# Patient Record
Sex: FEMALE | Race: WHITE | NOT HISPANIC OR LATINO | Employment: FULL TIME | URBAN - METROPOLITAN AREA
[De-identification: names, ages, dates, MRNs, and addresses within clinical notes are randomized per-mention and may not be internally consistent; named-entity substitution may affect disease eponyms.]

---

## 2017-09-07 DIAGNOSIS — I10 ESSENTIAL HYPERTENSION WITH GOAL BLOOD PRESSURE LESS THAN 140/90: ICD-10-CM

## 2017-09-07 RX ORDER — LABETALOL 200 MG/1
TABLET, FILM COATED ORAL
Qty: 180 TABLET | Refills: 0 | Status: SHIPPED | OUTPATIENT
Start: 2017-09-07 | End: 2018-01-12

## 2017-09-07 NOTE — TELEPHONE ENCOUNTER
Pending Prescriptions:                       Disp   Refills    labetalol (NORMODYNE) 200 MG tablet [Phar*180 ta*3            Sig: TAKE 1 TABLET(200 MG) BY MOUTH TWICE DAILY          Last Written Prescription Date: 09/28/2016  Last Fill Quantity: 180, # refills: 3    Last Office Visit with [facility], [facility] or [facility] prescribing provider:  09/28/2016   Future Office Visit:        BP Readings from Last 3 Encounters:   09/28/16 128/86   08/02/16 (!) 146/94   05/13/16 (!) 145/92

## 2017-10-26 DIAGNOSIS — I10 ESSENTIAL HYPERTENSION WITH GOAL BLOOD PRESSURE LESS THAN 140/90: ICD-10-CM

## 2017-10-27 RX ORDER — LABETALOL 200 MG/1
TABLET, FILM COATED ORAL
Start: 2017-10-27

## 2017-10-27 NOTE — TELEPHONE ENCOUNTER
Denied   Refill request too early; Rx sent 9/7/2017 for 3 months  Ro MARTELL, RN    Pending Prescriptions:                       Disp   Refills    labetalol (NORMODYNE) 200 MG tablet [Pharm*60 tab*0        Sig: TAKE 1 TABLET(200 MG) BY MOUTH TWICE DAILY        Last Written Prescription Date:   Last Fill Quantity: , # refills:   Last Office Visit with [facility], [facility] or [facility] prescribing provider:        Potassium   Date Value Ref Range Status   09/28/2016 3.9 3.4 - 5.3 mmol/L Final     Creatinine   Date Value Ref Range Status   09/28/2016 0.70 0.52 - 1.04 mg/dL Final     BP Readings from Last 3 Encounters:   09/28/16 128/86   08/02/16 (!) 146/94   05/13/16 (!) 145/92

## 2017-11-13 DIAGNOSIS — I10 ESSENTIAL HYPERTENSION WITH GOAL BLOOD PRESSURE LESS THAN 140/90: ICD-10-CM

## 2017-11-14 RX ORDER — LABETALOL 200 MG/1
TABLET, FILM COATED ORAL
Start: 2017-11-14

## 2017-11-14 NOTE — TELEPHONE ENCOUNTER
Denied  Refill request too early; Rx sent 9/7/2017 for 3 months  Ro MARTELL RN    Requested Prescriptions   Pending Prescriptions Disp Refills     labetalol (NORMODYNE) 200 MG tablet [Pharmacy Med Name: LABETALOL 200MG TABLETS] 60 tablet 0     Sig: TAKE 1 TABLET(200 MG) BY MOUTH TWICE DAILY    Beta-Blockers Protocol Failed    11/13/2017  6:22 PM       Failed - Blood pressure under 140/90    BP Readings from Last 3 Encounters:   09/28/16 128/86   08/02/16 (!) 146/94   05/13/16 (!) 145/92                Failed - Recent or future visit with authorizing provider's specialty    Patient had office visit in the last year or has a visit in the next 30 days with authorizing provider.  See chart review.              Passed - Patient is age 6 or older

## 2017-12-13 DIAGNOSIS — M79.672 LEFT FOOT PAIN: Primary | ICD-10-CM

## 2017-12-14 ENCOUNTER — OFFICE VISIT (OUTPATIENT)
Dept: ORTHOPEDICS | Facility: CLINIC | Age: 52
End: 2017-12-14

## 2017-12-14 ENCOUNTER — RADIANT APPOINTMENT (OUTPATIENT)
Dept: GENERAL RADIOLOGY | Facility: CLINIC | Age: 52
End: 2017-12-14

## 2017-12-14 VITALS — SYSTOLIC BLOOD PRESSURE: 165 MMHG | RESPIRATION RATE: 18 BRPM | HEART RATE: 106 BPM | DIASTOLIC BLOOD PRESSURE: 108 MMHG

## 2017-12-14 DIAGNOSIS — E66.01 MORBID OBESITY (H): ICD-10-CM

## 2017-12-14 DIAGNOSIS — M79.672 ARCH PAIN OF LEFT FOOT: Primary | ICD-10-CM

## 2017-12-14 DIAGNOSIS — M79.672 LEFT FOOT PAIN: ICD-10-CM

## 2017-12-14 NOTE — MR AVS SNAPSHOT
After Visit Summary   12/14/2017    Neelam Tovar    MRN: [id number]           Patient Information     Date Of Birth          1965        Visit Information        Provider Department      12/14/2017 5:45 PM Ericka James MD [facility]        Today's Diagnoses     Arch pain of left foot    -  1    Morbid obesity (H)           Follow-ups after your visit        Additional Services     NUTRITION REFERRAL       Your provider has referred you to: PREFERRED PROVIDERS: Sabine Metcalf    Please be aware that coverage of these services is subject to the terms and limitations of your health insurance plan.  Call member services at your health plan with any benefit or coverage questions.      Please bring the following with you to your appointment:    (1) This referral request  (2) Any documents given to you regarding this referral  (3) Any specific questions you have about diet and/or food choices            PHYSICAL THERAPY REFERRAL (External-Prints)       Physical Therapy Referral                  Follow-up notes from your care team     Return in about 6 weeks (around 1/25/2018), or if symptoms worsen or fail to improve.      Your next 10 appointments already scheduled     Dameon 10, 2018  1:30 PM CST   NUTRITION VISIT with Sabine Metcalf RD   [facility] ([facility])    [address] [place] [address] [phone]              Who to contact     Please call your clinic at [phone] to:    Ask questions about your health    Make or cancel appointments    Discuss your medicines    Learn about your test results    Speak to your doctor   If you have compliments or concerns about an experience at your clinic, or if you wish to file a complaint, please contact [facility] Patient Relations at [phone] or email us at [email or web address]         Additional  Information About Your Visit        [employer] Information     [employer] gives you secure access to your electronic health record. If you see a primary care provider, you can also send messages to your care team and make appointments. If you have questions, please call your primary care clinic.  If you do not have a primary care provider, please call [phone] and they will assist you.      [employer] is an electronic gateway that provides easy, online access to your medical records. With [employer], you can request a clinic appointment, read your test results, renew a prescription or communicate with your care team.     To access your existing account, please contact your [facility] or call [phone] for assistance.        Care EveryWhere ID     This is your Care EveryWhere ID. This could be used by other organizations to access your [place] medical records  [id number]        Your Vitals Were     Pulse Respirations                106 18           Blood Pressure from Last 3 Encounters:   12/14/17 (!) 165/108   09/28/16 128/86   08/02/16 (!) 146/94    Weight from Last 3 Encounters:   09/28/16 (!) 354 lb (160.6 kg)   08/02/16 (!) 345 lb (156.5 kg)   05/13/16 (!) 344 lb (156 kg)              We Performed the Following     NUTRITION REFERRAL     PHYSICAL THERAPY REFERRAL (External-Prints)        Primary Care Provider Office Phone # Fax #    Madelin Davis [phone] [address] [address]        Equal Access to Services     HOLLY KELLER : Hadii aad ku hadasho Soomaali, waaxda luqadaha, qaybta kaalmada adeegyada, waxriaz radha jacobs . So [facility] [phone].    ATENCIÓN: Si habla español, tiene a souza disposición servicios gratuitos de asistencia lingüística. Llame al [phone].    We comply with applicable federal civil rights laws and [place] laws. We do not discriminate on the basis of race, color, national origin, age,  disability, sex, sexual orientation, or gender identity.            Thank you!     Thank you for choosing [facility]  for your care. Our goal is always to provide you with excellent care. Hearing back from our patients is one way we can continue to improve our services. Please take a few minutes to complete the written survey that you may receive in the mail after your visit with us. Thank you!             Your Updated Medication List - Protect others around you: Learn how to safely use, store and throw away your medicines at www.disposemymeds.org.          This list is accurate as of: 12/14/17 11:59 PM.  Always use your most recent med list.                   Brand Name Dispense Instructions for use Diagnosis    * albuterol 108 (90 BASE) MCG/ACT Inhaler    PROAIR HFA/PROVENTIL HFA/VENTOLIN HFA    1 Inhaler    Inhale 2 puffs into the lungs every 6 hours as needed for shortness of breath / dyspnea or wheezing    Bronchitis       * albuterol 108 (90 BASE) MCG/ACT Inhaler    PROAIR HFA/PROVENTIL HFA/VENTOLIN HFA    1 Inhaler    Inhale 2 puffs into the lungs every 6 hours as needed for shortness of breath / dyspnea or wheezing    Bronchitis       cefuroxime 250 MG tablet    CEFTIN     Take 250 mg by mouth 2 times daily        * clobetasol 0.05 % cream    TEMOVATE    60 g    Apply sparingly twice a day    Contact dermatitis       * clobetasol 0.05 % ointment    TEMOVATE    30 g    Apply sparingly to affected area twice daily for 14 days.  Do not apply to face.    Circumscribed scleroderma       labetalol 200 MG tablet    NORMODYNE    180 tablet    TAKE 1 TABLET(200 MG) BY MOUTH TWICE DAILY    Essential hypertension with goal blood pressure less than 140/90       * Notice:  This list has 4 medication(s) that are the same as other medications prescribed for you. Read the directions carefully, and ask your doctor or other care provider to review them with you.

## 2017-12-14 NOTE — LETTER
12/14/2017      RE: Neelam Tovar  [address]  [facility] [address]        Subjective:   Neelam Tovar is a 52 year old female who is here complaining of left foot pain. She doesn't have ROBYN. Pain started after a walk.   Too much too soon.  Spring time, hurt in the arch of the left foot.  6 weeks ago more walking, not wearing tennis shoes, not good shoes when have a high arch.  Worse and wants to know if there's more damage.    Plantar fascitis in the past.  Heel hurts and time to get moving in the morning.  Arch pain in the past both feet, now just the left.  No lifts.  Wearing different shoes, now tennis shoes daily.    Emotional eater, ok with support, nutrition knowledge.  In the car for 30 min and feet are stiff.  Wanting with special needs child, unable to continue walking program.  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  Pt reports know nutrition, doesn't always follow.    Background:   Date of injury: NA   Duration of symptoms: 6 weeks  Mechanism of Injury: Insidious Onset; Unknown    Aggravating factors: Walking, driving  Relieving Factors: rest, ice, NSAIDs and massage  Prior Evaluation: None    PAST MEDICAL, SOCIAL, SURGICAL AND FAMILY HISTORY: She  has a past medical history of Allergic rhinitis due to other allergen; HTN; Lichen sclerosis of vulva (8/07); and Obstructive sleep apnea (adult) (pediatric).  She  has a past surgical history that includes TOOTH EXTRACTION W/FORCEP and excis chalazion,single.  Her family history includes C.A.D. in her father and mother; CANCER in her maternal grandmother and mother; DIABETES in her mother; Hypertension in her father, mother, and sister; Respiratory in her father.  She reports that she has never smoked. She has never used smokeless tobacco. She reports that she drinks alcohol. She reports that she does not use illicit drugs.      ALLERGIES: She is allergic to no known allergies.    CURRENT MEDICATIONS: She has a current medication list  which includes the following prescription(s): labetalol, cefuroxime, clobetasol, albuterol, albuterol, and clobetasol.     REVIEW OF SYSTEMS: 10 point review of systems is negative except as noted above.     Exam:   There were no vitals taken for this visit.           CONSTITUTIONAL: healthy, alert, no distress and cooperative  HEAD: Normocephalic. No masses, lesions, tenderness or abnormalities  SKIN: no suspicious lesions or rashes  GAIT: antalgic and obese pattern  NEUROLOGIC: Non-focal, Normal muscle tone and strength, reflexes normal, sensation grossly normal.  PSYCHIATRIC: affect normal/bright and mentation appears normal.    MUSCULOSKELETAL: left foot pain, arch    ANKLE  Inspection/Palpation:     Swelling: no swelling      Non-tender: ATFL, CFL, PTFL, medial malleolus, deltoid ligament, anterior tib-fib ligament, achilles  tendon, no achilles tendon defect   Range of Motion: dorsiflexion: full, plantarflexion: full, inversion: full, eversion: full  Strength:dorsiflexion: 5/5, plantarflexion: 5/5, inversion: 5/5, eversion: 5/5   Special tests: negative anterior drawer, negative varus stress, negative valgus stress, negative forced external rotation, negative Crandall sign     FOOT  Inspection/Palpation:      Swelling: no swelling  Tender: medial arch, plantar fascia     Non-tender: promixal 5th metatarsal, 1st, 2nd, 3rd, 4th, 5th metatarsals, calcaneous, cuboid,  navicular, cuneiform lateral, cuneiform middle, cuneiform medial, metatarsal heads, peroneal tendon: at lateral malleolus, at cuboid, at proximal 5th metatarsal, posterior tibial tendon at medial malleolus, posterior tibial tendon at navicular  Range of Motion: flexion of toes: full, extension of toes: full         Assessment/Plan:   Pt is a 51 yo obese white female with PMHx of KAREN, HTN presenting with left foot pain  1. Left foot pain- medial arch- trial of superfeet, stretches demonstrated, ice, pool therapy  2. Obesity- nutrition referral Sabine  Dixon  RTC 6 weeks    X-RAY INTERPRETATION:   X-Ray of the Left Foot: 3-view, ap, lateral, oblique   ordered and interpreted in the office today was negative for fracture, subluxation or joint space abnormality.  IMPRESSION: No acute bone abnormality in the left foot.    Ericka James MD

## 2017-12-15 NOTE — PROGRESS NOTES
Subjective:   Neelam Tovar is a 52 year old female who is here complaining of left foot pain. She doesn't have ROBYN. Pain started after a walk.   Too much too soon.  Spring time, hurt in the arch of the left foot.  6 weeks ago more walking, not wearing tennis shoes, not good shoes when have a high arch.  Worse and wants to know if there's more damage.    Plantar fascitis in the past.  Heel hurts and time to get moving in the morning.  Arch pain in the past both feet, now just the left.  No lifts.  Wearing different shoes, now tennis shoes daily.    Emotional eater, ok with support, nutrition knowledge.  In the car for 30 min and feet are stiff.  Wanting with special needs child, unable to continue walking program.  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  Pt reports know nutrition, doesn't always follow.    Background:   Date of injury: NA   Duration of symptoms: 6 weeks  Mechanism of Injury: Insidious Onset; Unknown    Aggravating factors: Walking, driving  Relieving Factors: rest, ice, NSAIDs and massage  Prior Evaluation: None    PAST MEDICAL, SOCIAL, SURGICAL AND FAMILY HISTORY: She  has a past medical history of Allergic rhinitis due to other allergen; HTN; Lichen sclerosis of vulva (8/07); and Obstructive sleep apnea (adult) (pediatric).  She  has a past surgical history that includes TOOTH EXTRACTION W/FORCEP and excis chalazion,single.  Her family history includes C.A.D. in her father and mother; CANCER in her maternal grandmother and mother; DIABETES in her mother; Hypertension in her father, mother, and sister; Respiratory in her father.  She reports that she has never smoked. She has never used smokeless tobacco. She reports that she drinks alcohol. She reports that she does not use illicit drugs.      ALLERGIES: She is allergic to no known allergies.    CURRENT MEDICATIONS: She has a current medication list which includes the following prescription(s): labetalol, cefuroxime, clobetasol, albuterol,  albuterol, and clobetasol.     REVIEW OF SYSTEMS: 10 point review of systems is negative except as noted above.     Exam:   There were no vitals taken for this visit.           CONSTITUTIONAL: healthy, alert, no distress and cooperative  HEAD: Normocephalic. No masses, lesions, tenderness or abnormalities  SKIN: no suspicious lesions or rashes  GAIT: antalgic and obese pattern  NEUROLOGIC: Non-focal, Normal muscle tone and strength, reflexes normal, sensation grossly normal.  PSYCHIATRIC: affect normal/bright and mentation appears normal.    MUSCULOSKELETAL: left foot pain, arch    ANKLE  Inspection/Palpation:     Swelling: no swelling      Non-tender: ATFL, CFL, PTFL, medial malleolus, deltoid ligament, anterior tib-fib ligament, achilles  tendon, no achilles tendon defect   Range of Motion: dorsiflexion: full, plantarflexion: full, inversion: full, eversion: full  Strength:dorsiflexion: 5/5, plantarflexion: 5/5, inversion: 5/5, eversion: 5/5   Special tests: negative anterior drawer, negative varus stress, negative valgus stress, negative forced external rotation, negative Crandall sign     FOOT  Inspection/Palpation:      Swelling: no swelling  Tender: medial arch, plantar fascia     Non-tender: promixal 5th metatarsal, 1st, 2nd, 3rd, 4th, 5th metatarsals, calcaneous, cuboid,  navicular, cuneiform lateral, cuneiform middle, cuneiform medial, metatarsal heads, peroneal tendon: at lateral malleolus, at cuboid, at proximal 5th metatarsal, posterior tibial tendon at medial malleolus, posterior tibial tendon at navicular  Range of Motion: flexion of toes: full, extension of toes: full         Assessment/Plan:   Pt is a 53 yo obese white female with PMHx of KAREN, HTN presenting with left foot pain  1. Left foot pain- medial arch- trial of superfeet, stretches demonstrated, ice, pool therapy  2. Obesity- nutrition referral Sabine Metcalf  RTC 6 weeks    X-RAY INTERPRETATION:   X-Ray of the Left Foot: 3-view, ap, lateral,  oblique   ordered and interpreted in the office today was negative for fracture, subluxation or joint space abnormality.  IMPRESSION: No acute bone abnormality in the left foot.

## 2018-01-12 ENCOUNTER — OFFICE VISIT (OUTPATIENT)
Dept: FAMILY MEDICINE | Facility: CLINIC | Age: 53
End: 2018-01-12
Payer: COMMERCIAL

## 2018-01-12 VITALS
OXYGEN SATURATION: 99 % | SYSTOLIC BLOOD PRESSURE: 148 MMHG | TEMPERATURE: 98.7 F | HEART RATE: 76 BPM | DIASTOLIC BLOOD PRESSURE: 96 MMHG | WEIGHT: 293 LBS | BODY MASS INDEX: 48.82 KG/M2 | HEIGHT: 65 IN

## 2018-01-12 DIAGNOSIS — I10 ESSENTIAL HYPERTENSION WITH GOAL BLOOD PRESSURE LESS THAN 140/90: ICD-10-CM

## 2018-01-12 PROCEDURE — 36415 COLL VENOUS BLD VENIPUNCTURE: CPT | Performed by: FAMILY MEDICINE

## 2018-01-12 PROCEDURE — 80053 COMPREHEN METABOLIC PANEL: CPT | Performed by: FAMILY MEDICINE

## 2018-01-12 PROCEDURE — 99214 OFFICE O/P EST MOD 30 MIN: CPT | Performed by: FAMILY MEDICINE

## 2018-01-12 PROCEDURE — 84443 ASSAY THYROID STIM HORMONE: CPT | Performed by: FAMILY MEDICINE

## 2018-01-12 RX ORDER — LABETALOL 200 MG/1
TABLET, FILM COATED ORAL
Qty: 180 TABLET | Refills: 0 | Status: SHIPPED | OUTPATIENT
Start: 2018-01-12 | End: 2018-02-13

## 2018-01-12 RX ORDER — FLUTICASONE PROPIONATE 50 MCG
SPRAY, SUSPENSION (ML) NASAL DAILY
COMMUNITY
End: 2020-04-22

## 2018-01-12 NOTE — NURSING NOTE
"Chief Complaint   Patient presents with     Hypertension     BP (!) 148/96  Pulse 76  Temp 98.7  F (37.1  C) (Tympanic)  Ht 5' 5\" (1.651 m)  Wt (!) 348 lb (157.9 kg)  SpO2 99%  BMI 57.91 kg/m2 Estimated body mass index is 57.91 kg/(m^2) as calculated from the following:    Height as of this encounter: 5' 5\" (1.651 m).    Weight as of this encounter: 348 lb (157.9 kg).  Medication Reconciliation: complete      Health Maintenance due pending provider review:  Colonoscopy/FIT    Would like FIT    Isaura Rae CMA  "

## 2018-01-12 NOTE — MR AVS SNAPSHOT
After Visit Summary   1/12/2018    Neelam Tovar    MRN: [id number]           Patient Information     Date Of Birth          1965        Visit Information        Provider Department      1/12/2018 2:00 PM Madelin Davis MD [facility]        Today's Diagnoses     Essential hypertension with goal blood pressure less than 140/90           Follow-ups after your visit        Your next 10 appointments already scheduled     Feb 13, 2018  2:30 PM CST   MyChart Long with Madelin Davis MD   [facility] ([facility])    [address] [facility] [address] [phone]              Who to contact     If you have questions or need follow up information about today's clinic visit or your schedule please contact [facility] directly at [phone].  Normal or non-critical lab and imaging results will be communicated to you by MyChart, letter or phone within 4 business days after the clinic has received the results. If you do not hear from us within 7 days, please contact the clinic through [employer] or phone. If you have a critical or abnormal lab result, we will notify you by phone as soon as possible.  Submit refill requests through [employer] or call your pharmacy and they will forward the refill request to us. Please allow 3 business days for your refill to be completed.          Additional Information About Your Visit        MyChart Information     [employer] gives you secure access to your electronic health record. If you see a primary care provider, you can also send messages to your care team and make appointments. If you have questions, please call your primary care clinic.  If you do not have a primary care provider, please call [phone] and they will assist you.        Care EveryWhere ID     This is your Care EveryWhere ID. This could be used by other organizations to access your [place] medical records  [id number]        Your Vitals  "Were     Pulse Temperature Height Pulse Oximetry BMI (Body Mass Index)       76 98.7  F (37.1  C) (Tympanic) 5' 5\" (1.651 m) 99% 57.91 kg/m2        Blood Pressure from Last 3 Encounters:   01/12/18 (!) 148/96   12/14/17 (!) 165/108   09/28/16 128/86    Weight from Last 3 Encounters:   01/12/18 (!) 348 lb (157.9 kg)   09/28/16 (!) 354 lb (160.6 kg)   08/02/16 (!) 345 lb (156.5 kg)              We Performed the Following     Comprehensive metabolic panel (BMP + Alb, Alk Phos, ALT, AST, Total. Bili, TP)     TSH with free T4 reflex          Today's Medication Changes          These changes are accurate as of: 1/12/18 11:59 PM.  If you have any questions, ask your nurse or doctor.               These medicines have changed or have updated prescriptions.        Dose/Directions    clobetasol 0.05 % ointment   Commonly known as:  TEMOVATE   This may have changed:  Another medication with the same name was removed. Continue taking this medication, and follow the directions you see here.   Used for:  Circumscribed scleroderma   Changed by:  Madelin Davis MD        Apply sparingly to affected area twice daily for 14 days.  Do not apply to face.   Quantity:  30 g   Refills:  3       labetalol 200 MG tablet   Commonly known as:  NORMODYNE   This may have changed:  See the new instructions.   Used for:  Essential hypertension with goal blood pressure less than 140/90   Changed by:  Madelin Davis MD        TAKE 1 TABLET(200 MG) BY MOUTH TWICE DAILY   Quantity:  180 tablet   Refills:  0         Stop taking these medicines if you haven't already. Please contact your care team if you have questions.     cefuroxime 250 MG tablet   Commonly known as:  CEFTIN   Stopped by:  Madelin Davis MD                Where to get your medicines      These medications were sent to "[facility] [address] [place] [address] AT [address] [place] [address] & [address] [address]     Phone:  [phone]    "  labetalol 200 MG tablet                Primary Care Provider Office Phone # Fax #    Madelin Davis [phone] [address] [address]        Equal Access to Services     HOLLY KELLER : Hadii aad ku hadkarynao Soomaali, waaxda luqadaha, qaybta kaalmada adeegyada, christiano singhn john galicia lapoojamagy horner. So [facility] [phone].    ATENCIÓN: Si habla español, tiene a souza disposición servicios gratuitos de asistencia lingüística. Llame al [phone].    We comply with applicable federal civil rights laws and [place] laws. We do not discriminate on the basis of race, color, national origin, age, disability, sex, sexual orientation, or gender identity.            Thank you!     Thank you for choosing [facility]  for your care. Our goal is always to provide you with excellent care. Hearing back from our patients is one way we can continue to improve our services. Please take a few minutes to complete the written survey that you may receive in the mail after your visit with us. Thank you!             Your Updated Medication List - Protect others around you: Learn how to safely use, store and throw away your medicines at www.disposemymeds.org.          This list is accurate as of: 1/12/18 11:59 PM.  Always use your most recent med list.                   Brand Name Dispense Instructions for use Diagnosis    * albuterol 108 (90 BASE) MCG/ACT Inhaler    PROAIR HFA/PROVENTIL HFA/VENTOLIN HFA    1 Inhaler    Inhale 2 puffs into the lungs every 6 hours as needed for shortness of breath / dyspnea or wheezing    Bronchitis       * albuterol 108 (90 BASE) MCG/ACT Inhaler    PROAIR HFA/PROVENTIL HFA/VENTOLIN HFA    1 Inhaler    Inhale 2 puffs into the lungs every 6 hours as needed for shortness of breath / dyspnea or wheezing    Bronchitis       clobetasol 0.05 % ointment    TEMOVATE    30 g    Apply sparingly to affected area twice daily for 14 days.  Do not apply to face.     Circumscribed scleroderma       fluticasone 50 MCG/ACT spray    FLONASE     Spray into both nostrils daily        labetalol 200 MG tablet    NORMODYNE    180 tablet    TAKE 1 TABLET(200 MG) BY MOUTH TWICE DAILY    Essential hypertension with goal blood pressure less than 140/90       * Notice:  This list has 2 medication(s) that are the same as other medications prescribed for you. Read the directions carefully, and ask your doctor or other care provider to review them with you.

## 2018-01-12 NOTE — PROGRESS NOTES
SUBJECTIVE:   Neelam Tovar is a 52 year old female who presents to clinic today for the following health issues:      Hypertension Follow-up      Outpatient blood pressures are not being checked.    Low Salt Diet: low salt        Amount of exercise or physical activity: nothing regular, starting PT this week for foot    Problems taking medications regularly: Yes--missing about 3/7 days week morning dose    Medication side effects: none    Diet: regular (no restrictions)            Problem list and histories reviewed & adjusted, as indicated.  Additional history: as documented    Patient Active Problem List   Diagnosis     Other acne     Obstructive sleep apnea     Allergic rhinitis due to other allergen     Lichen sclerosis of vulva     CARDIOVASCULAR SCREENING; LDL GOAL LESS THAN 160     Hypertension goal BP (blood pressure) < 140/90     Allergic rhinitis due to pollen     Past Surgical History:   Procedure Laterality Date     EXCIS CHALAZION,SINGLE       HC TOOTH EXTRACTION W/FORCEP         Social History   Substance Use Topics     Smoking status: Never Smoker     Smokeless tobacco: Never Used     Alcohol use 0.0 oz/week     0 Standard drinks or equivalent per week      Comment: 2 drinks per week     Family History   Problem Relation Age of Onset     C.A.D. Mother      C.A.D. Father      DIABETES Mother      Hypertension Mother      Hypertension Father      CANCER Maternal Grandmother      Respiratory Father      sleep apnea     CANCER Mother      pancreatic      Hypertension Sister          Current Outpatient Prescriptions   Medication Sig Dispense Refill     fluticasone (FLONASE) 50 MCG/ACT spray Spray into both nostrils daily       labetalol (NORMODYNE) 200 MG tablet TAKE 1 TABLET(200 MG) BY MOUTH TWICE DAILY 180 tablet 0     clobetasol (TEMOVATE) 0.05 % ointment Apply sparingly to affected area twice daily for 14 days.  Do not apply to face. 30 g 3     albuterol (PROAIR HFA, PROVENTIL HFA, VENTOLIN  "HFA) 108 (90 BASE) MCG/ACT inhaler Inhale 2 puffs into the lungs every 6 hours as needed for shortness of breath / dyspnea or wheezing (Patient not taking: Reported on 12/14/2017) 1 Inhaler 3     albuterol (PROAIR HFA, PROVENTIL HFA, VENTOLIN HFA) 108 (90 BASE) MCG/ACT inhaler Inhale 2 puffs into the lungs every 6 hours as needed for shortness of breath / dyspnea or wheezing (Patient not taking: Reported on 12/14/2017) 1 Inhaler 3     Allergies   Allergen Reactions     Dust Mites      Recent Labs   Lab Test  01/12/18   1446  09/28/16   1615  06/24/14   1518   01/28/11   1116   LDL   --   118*   --    --   107   HDL   --   57   --    --   49*   TRIG   --   133   --    --   94   ALT  22  19   --    --    --    CR  0.77  0.70  0.91   < >  0.75   GFRESTIMATED  79  89  66   < >  84   GFRESTBLACK  >90  >90  African American GFR Calc    80   < >  >90   POTASSIUM  3.9  3.9  4.2   < >  4.4   TSH  1.97   --   2.26   --   3.30    < > = values in this interval not displayed.      BP Readings from Last 3 Encounters:   01/12/18 (!) 148/96   12/14/17 (!) 165/108   09/28/16 128/86    Wt Readings from Last 3 Encounters:   01/12/18 (!) 348 lb (157.9 kg)   09/28/16 (!) 354 lb (160.6 kg)   08/02/16 (!) 345 lb (156.5 kg)                  Labs reviewed in EPIC          Reviewed and updated as needed this visit by clinical staffTobacco  Allergies  Meds  Med Hx  Surg Hx  Fam Hx  Soc Hx      Reviewed and updated as needed this visit by Provider         ROS:  Constitutional, HEENT, cardiovascular, pulmonary, GI, , musculoskeletal, neuro, skin, endocrine and psych systems are negative, except as otherwise noted.      OBJECTIVE:   BP (!) 148/96  Pulse 76  Temp 98.7  F (37.1  C) (Tympanic)  Ht 5' 5\" (1.651 m)  Wt (!) 348 lb (157.9 kg)  SpO2 99%  BMI 57.91 kg/m2  Body mass index is 57.91 kg/(m^2).  GENERAL: healthy, alert and no distress  EYES: Eyes grossly normal to inspection, PERRL and conjunctivae and sclerae normal  HENT: ear " canals and TM's normal, nose and mouth without ulcers or lesions  NECK: no adenopathy, no asymmetry, masses, or scars and thyroid normal to palpation  RESP: lungs clear to auscultation - no rales, rhonchi or wheezes  CV: regular rate and rhythm, normal S1 S2, no S3 or S4, no murmur, click or rub, no peripheral edema and peripheral pulses strong  ABDOMEN: soft, nontender, no hepatosplenomegaly, no masses and bowel sounds normal  MS: no gross musculoskeletal defects noted, no edema  NEURO: Normal strength and tone, mentation intact and speech normal    Diagnostic Test Results:  Results for orders placed or performed in visit on 01/12/18   Comprehensive metabolic panel (BMP + Alb, Alk Phos, ALT, AST, Total. Bili, TP)   Result Value Ref Range    Sodium 141 133 - 144 mmol/L    Potassium 3.9 3.4 - 5.3 mmol/L    Chloride 108 94 - 109 mmol/L    Carbon Dioxide 28 20 - 32 mmol/L    Anion Gap 5 3 - 14 mmol/L    Glucose 99 70 - 99 mg/dL    Urea Nitrogen 15 7 - 30 mg/dL    Creatinine 0.77 0.52 - 1.04 mg/dL    GFR Estimate 79 >60 mL/min/1.7m2    GFR Estimate If Black >90 >60 mL/min/1.7m2    Calcium 8.9 8.5 - 10.1 mg/dL    Bilirubin Total 0.5 0.2 - 1.3 mg/dL    Albumin 4.0 3.4 - 5.0 g/dL    Protein Total 7.4 6.8 - 8.8 g/dL    Alkaline Phosphatase 81 40 - 150 U/L    ALT 22 0 - 50 U/L    AST 16 0 - 45 U/L   TSH with free T4 reflex   Result Value Ref Range    TSH 1.97 0.40 - 4.00 mU/L       ASSESSMENT/PLAN:             1. Essential hypertension with goal blood pressure less than 140/90  She is due for the FIT and will do , take the kit today , no hx of any BM issues , no  FH od colon cancer   Discussed taking the morning dose of the labetalol as she states that she only takes the evening one , forgets in the am , but her BP is elevated today , she states that she was rushing coming in here , we discussed after regularly checking at home and taking both doses of the labetalol , would need to come for a f/u check here in 2 weeks,  .pln    - Fecal colorectal cancer screen FIT; Future  - labetalol (NORMODYNE) 200 MG tablet; TAKE 1 TABLET(200 MG) BY MOUTH TWICE DAILY  Dispense: 180 tablet; Refill: 0  - Comprehensive metabolic panel (BMP + Alb, Alk Phos, ALT, AST, Total. Bili, TP)  - TSH with free T4 reflex    RTC if no improving or worsening.  Pt is aware  and comfortable with the current plan.      Madelin Davis MD  [facility]

## 2018-01-13 LAB
ALBUMIN SERPL-MCNC: 4 G/DL (ref 3.4–5)
ALP SERPL-CCNC: 81 U/L (ref 40–150)
ALT SERPL W P-5'-P-CCNC: 22 U/L (ref 0–50)
ANION GAP SERPL CALCULATED.3IONS-SCNC: 5 MMOL/L (ref 3–14)
AST SERPL W P-5'-P-CCNC: 16 U/L (ref 0–45)
BILIRUB SERPL-MCNC: 0.5 MG/DL (ref 0.2–1.3)
BUN SERPL-MCNC: 15 MG/DL (ref 7–30)
CALCIUM SERPL-MCNC: 8.9 MG/DL (ref 8.5–10.1)
CHLORIDE SERPL-SCNC: 108 MMOL/L (ref 94–109)
CO2 SERPL-SCNC: 28 MMOL/L (ref 20–32)
CREAT SERPL-MCNC: 0.77 MG/DL (ref 0.52–1.04)
GFR SERPL CREATININE-BSD FRML MDRD: 79 ML/MIN/1.7M2
GLUCOSE SERPL-MCNC: 99 MG/DL (ref 70–99)
POTASSIUM SERPL-SCNC: 3.9 MMOL/L (ref 3.4–5.3)
PROT SERPL-MCNC: 7.4 G/DL (ref 6.8–8.8)
SODIUM SERPL-SCNC: 141 MMOL/L (ref 133–144)
TSH SERPL DL<=0.005 MIU/L-ACNC: 1.97 MU/L (ref 0.4–4)

## 2018-01-21 PROCEDURE — 82274 ASSAY TEST FOR BLOOD FECAL: CPT | Performed by: FAMILY MEDICINE

## 2018-01-24 DIAGNOSIS — I10 ESSENTIAL HYPERTENSION WITH GOAL BLOOD PRESSURE LESS THAN 140/90: ICD-10-CM

## 2018-01-24 LAB — HEMOCCULT STL QL IA: NEGATIVE

## 2018-02-13 ENCOUNTER — OFFICE VISIT (OUTPATIENT)
Dept: FAMILY MEDICINE | Facility: CLINIC | Age: 53
End: 2018-02-13
Payer: COMMERCIAL

## 2018-02-13 VITALS
OXYGEN SATURATION: 97 % | TEMPERATURE: 99 F | DIASTOLIC BLOOD PRESSURE: 78 MMHG | WEIGHT: 293 LBS | HEIGHT: 65 IN | HEART RATE: 77 BPM | BODY MASS INDEX: 48.82 KG/M2 | SYSTOLIC BLOOD PRESSURE: 132 MMHG

## 2018-02-13 DIAGNOSIS — J01.01 ACUTE RECURRENT MAXILLARY SINUSITIS: Primary | ICD-10-CM

## 2018-02-13 DIAGNOSIS — I10 ESSENTIAL HYPERTENSION WITH GOAL BLOOD PRESSURE LESS THAN 140/90: ICD-10-CM

## 2018-02-13 PROCEDURE — 99214 OFFICE O/P EST MOD 30 MIN: CPT | Performed by: FAMILY MEDICINE

## 2018-02-13 RX ORDER — LABETALOL 200 MG/1
TABLET, FILM COATED ORAL
Qty: 180 TABLET | Refills: 3 | Status: SHIPPED | OUTPATIENT
Start: 2018-02-13 | End: 2018-09-28

## 2018-02-13 RX ORDER — LABETALOL 200 MG/1
TABLET, FILM COATED ORAL
Qty: 180 TABLET | Refills: 0 | Status: CANCELLED | OUTPATIENT
Start: 2018-02-13

## 2018-02-13 RX ORDER — AMOXICILLIN AND CLAVULANATE POTASSIUM 500; 125 MG/1; MG/1
1 TABLET, FILM COATED ORAL 2 TIMES DAILY
Qty: 20 TABLET | Refills: 0 | Status: SHIPPED | OUTPATIENT
Start: 2018-02-13 | End: 2018-03-12

## 2018-02-13 NOTE — MR AVS SNAPSHOT
"              After Visit Summary   2/13/2018    Neelam Tovar    MRN: [id number]           Patient Information     Date Of Birth          1965        Visit Information        Provider Department      2/13/2018 2:30 PM Madelin Davis MD [facility]        Today's Diagnoses     Acute recurrent maxillary sinusitis    -  1    Essential hypertension with goal blood pressure less than 140/90           Follow-ups after your visit        Who to contact     If you have questions or need follow up information about today's clinic visit or your schedule please contact [facility] directly at [phone].  Normal or non-critical lab and imaging results will be communicated to you by MyChart, letter or phone within 4 business days after the clinic has received the results. If you do not hear from us within 7 days, please contact the clinic through [employer]ger[employer] or phone. If you have a critical or abnormal lab result, we will notify you by phone as soon as possible.  Submit refill requests through [employer] or call your pharmacy and they will forward the refill request to us. Please allow 3 business days for your refill to be completed.          Additional Information About Your Visit        MyChart Information     [employer] gives you secure access to your electronic health record. If you see a primary care provider, you can also send messages to your care team and make appointments. If you have questions, please call your primary care clinic.  If you do not have a primary care provider, please call [phone] and they will assist you.        Care EveryWhere ID     This is your Care EveryWhere ID. This could be used by other organizations to access your [place] medical records  [id number]        Your Vitals Were     Pulse Temperature Height Pulse Oximetry Breastfeeding? BMI (Body Mass Index)    77 99  F (37.2  C) (Tympanic) 5' 5\" (1.651 m) 97% No 57.91 kg/m2       Blood Pressure from Last 3 " Encounters:   02/13/18 132/78   01/12/18 (!) 148/96   12/14/17 (!) 165/108    Weight from Last 3 Encounters:   02/13/18 (!) 348 lb (157.9 kg)   01/12/18 (!) 348 lb (157.9 kg)   09/28/16 (!) 354 lb (160.6 kg)              Today, you had the following     No orders found for display         Today's Medication Changes          These changes are accurate as of 2/13/18 11:59 PM.  If you have any questions, ask your nurse or doctor.               Start taking these medicines.        Dose/Directions    amoxicillin-clavulanate 500-125 MG per tablet   Commonly known as:  AUGMENTIN   Used for:  Acute recurrent maxillary sinusitis   Started by:  Madelin Davis MD        Dose:  1 tablet   Take 1 tablet by mouth 2 times daily   Quantity:  20 tablet   Refills:  0            Where to get your medicines      These medications were sent to [facility] [address] [place] [id number]     Phone:  [phone]     amoxicillin-clavulanate 500-125 MG per tablet    labetalol 200 MG tablet                Primary Care Provider Office Phone # Fax #    Madelin Davis [phone] [address] [facility] [address] [place] [id number]        Equal Access to Services     HOLLY KELLER AH: Hadii amy reynolds hadasho Soomaali, waaxda luqadaha, qaybta kaalmada adeegyada, christiano horner. So [facility] [phone].    ATENCIÓN: Si habla español, tiene a souza disposición servicios gratuitos de asistencia lingüística. Lionel al [phone].    We comply with applicable federal civil rights laws and [place] laws. We do not discriminate on the basis of race, color, national origin, age, disability, sex, sexual orientation, or gender identity.            Thank you!     Thank you for choosing [facility]  for your care. Our goal is always to provide you with excellent care. Hearing back from our patients is one way we can  continue to improve our services. Please take a few minutes to complete the written survey that you may receive in the mail after your visit with us. Thank you!             Your Updated Medication List - Protect others around you: Learn how to safely use, store and throw away your medicines at www.disposemymeds.org.          This list is accurate as of 2/13/18 11:59 PM.  Always use your most recent med list.                   Brand Name Dispense Instructions for use Diagnosis    albuterol 108 (90 BASE) MCG/ACT Inhaler    PROAIR HFA/PROVENTIL HFA/VENTOLIN HFA    1 Inhaler    Inhale 2 puffs into the lungs every 6 hours as needed for shortness of breath / dyspnea or wheezing    Bronchitis       amoxicillin-clavulanate 500-125 MG per tablet    AUGMENTIN    20 tablet    Take 1 tablet by mouth 2 times daily    Acute recurrent maxillary sinusitis       clobetasol 0.05 % ointment    TEMOVATE    30 g    Apply sparingly to affected area twice daily for 14 days.  Do not apply to face.    Circumscribed scleroderma       fluticasone 50 MCG/ACT spray    FLONASE     Spray into both nostrils daily        labetalol 200 MG tablet    NORMODYNE    180 tablet    TAKE 1 TABLET(200 MG) BY MOUTH TWICE DAILY    Essential hypertension with goal blood pressure less than 140/90

## 2018-02-13 NOTE — PROGRESS NOTES
SUBJECTIVE:   Neelam Tovar is a 52 year old female who presents to clinic today for the following health issues:      Hypertension Follow-up she has not been checking her blood pressure at home, but started taking labetalol twice a day.  Does not feel as dizzy as before ,seems the medications working.  No side effects ,she has been on labetalol for a few years now.      Outpatient blood pressures are not being checked.    Low Salt Diet: no added salt-low salt      Amount of exercise or physical activity: 2-3 days/week for an average of 30-45 minutes    Problems taking medications regularly: Yes,  problems remembering to take second dose    Medication side effects: none    Diet: low salt and breakfast skipped    2) also complains of maxillary sinus pressure for the last 2 weeks, started with an upper respiratory infection, now headaches and facial sensitivity, no fever, some mucus producing cough.  She has a history of sinus infections and worried that this is getting worse.  No wheezing, no chills.        Problem list and histories reviewed & adjusted, as indicated.  Additional history: as documented    Patient Active Problem List   Diagnosis     Other acne     Obstructive sleep apnea     Allergic rhinitis due to other allergen     Lichen sclerosis of vulva     CARDIOVASCULAR SCREENING; LDL GOAL LESS THAN 160     Hypertension goal BP (blood pressure) < 140/90     Allergic rhinitis due to pollen     Past Surgical History:   Procedure Laterality Date     EXCIS CHALAZION,SINGLE       HC TOOTH EXTRACTION W/FORCEP         Social History   Substance Use Topics     Smoking status: Never Smoker     Smokeless tobacco: Never Used     Alcohol use 0.0 oz/week     0 Standard drinks or equivalent per week      Comment: 2 drinks per week     Family History   Problem Relation Age of Onset     C.A.D. Mother      C.A.D. Father      DIABETES Mother      Hypertension Mother      Hypertension Father      CANCER Maternal  Grandmother      Respiratory Father      sleep apnea     CANCER Mother      pancreatic      Hypertension Sister          Current Outpatient Prescriptions   Medication Sig Dispense Refill     amoxicillin-clavulanate (AUGMENTIN) 500-125 MG per tablet Take 1 tablet by mouth 2 times daily 20 tablet 0     labetalol (NORMODYNE) 200 MG tablet TAKE 1 TABLET(200 MG) BY MOUTH TWICE DAILY 180 tablet 3     fluticasone (FLONASE) 50 MCG/ACT spray Spray into both nostrils daily       clobetasol (TEMOVATE) 0.05 % ointment Apply sparingly to affected area twice daily for 14 days.  Do not apply to face. 30 g 3     albuterol (PROAIR HFA, PROVENTIL HFA, VENTOLIN HFA) 108 (90 BASE) MCG/ACT inhaler Inhale 2 puffs into the lungs every 6 hours as needed for shortness of breath / dyspnea or wheezing (Patient not taking: Reported on 2/13/2018) 1 Inhaler 3     Allergies   Allergen Reactions     Dust Mites      Recent Labs   Lab Test  01/12/18   1446  09/28/16   1615  06/24/14   1518   01/28/11   1116   LDL   --   118*   --    --   107   HDL   --   57   --    --   49*   TRIG   --   133   --    --   94   ALT  22  19   --    --    --    CR  0.77  0.70  0.91   < >  0.75   GFRESTIMATED  79  89  66   < >  84   GFRESTBLACK  >90  >90  African American GFR Calc    80   < >  >90   POTASSIUM  3.9  3.9  4.2   < >  4.4   TSH  1.97   --   2.26   --   3.30    < > = values in this interval not displayed.      BP Readings from Last 3 Encounters:   02/13/18 132/78   01/12/18 (!) 148/96   12/14/17 (!) 165/108    Wt Readings from Last 3 Encounters:   02/13/18 (!) 348 lb (157.9 kg)   01/12/18 (!) 348 lb (157.9 kg)   09/28/16 (!) 354 lb (160.6 kg)                  Labs reviewed in EPIC    Reviewed and updated as needed this visit by clinical staff       Reviewed and updated as needed this visit by Provider         ROS:  Constitutional, HEENT, cardiovascular, pulmonary, GI, , musculoskeletal, neuro, skin, endocrine and psych systems are negative, except as  "otherwise noted.    OBJECTIVE:     /78  Pulse 77  Temp 99  F (37.2  C) (Tympanic)  Ht 5' 5\" (1.651 m)  Wt (!) 348 lb (157.9 kg)  SpO2 97%  Breastfeeding? No  BMI 57.91 kg/m2  Body mass index is 57.91 kg/(m^2).  GENERAL: healthy, alert and no distress  EYES: Eyes grossly normal to inspection, PERRL and conjunctivae and sclerae normal  HENT: ear canals and TM's normal, nose and mouth without ulcers or lesions  NECK: no adenopathy, no asymmetry, masses, or scars and thyroid normal to palpation  RESP: lungs clear to auscultation - no rales, rhonchi or wheezes  CV: regular rate and rhythm, normal S1 S2, no S3 or S4, no murmur, click or rub, no peripheral edema and peripheral pulses strong  MS: no gross musculoskeletal defects noted, no edema  NEURO: Normal strength and tone, mentation intact and speech normal    Diagnostic Test Results:  No results found for this or any previous visit (from the past 24 hour(s)).    ASSESSMENT/PLAN:             1. Essential hypertension with goal blood pressure less than 140/90  We discussed continuing labetalol twice a day.  Patient has had days where she forgets to take the morning dose.  She does tolerate this medication well and has no side effects, would need to come back for a follow-up in 3 months.    - labetalol (NORMODYNE) 200 MG tablet; TAKE 1 TABLET(200 MG) BY MOUTH TWICE DAILY  Dispense: 180 tablet; Refill: 3    2. Acute recurrent maxillary sinusitis  We discussed using [place] pot, lots of fluids, decongestant, rest and if that does not help she will start the antibiotic Augmentin for 10 days.  - amoxicillin-clavulanate (AUGMENTIN) 500-125 MG per tablet; Take 1 tablet by mouth 2 times daily  Dispense: 20 tablet; Refill: 0    She will come back if no improvement or any worsening symptoms.RTC if no improving or worsening.  Pt is aware  and comfortable with the current plan.      Madelin Davis MD  [facility]    "

## 2018-02-13 NOTE — NURSING NOTE
"Chief Complaint   Patient presents with     Hypertension     FOLLOW UP FOR MED REFILL       Initial /78  Pulse 77  Temp 99  F (37.2  C) (Tympanic)  Ht 5' 5\" (1.651 m)  Wt (!) 348 lb (157.9 kg)  SpO2 97%  Breastfeeding? No  BMI 57.91 kg/m2 Estimated body mass index is 57.91 kg/(m^2) as calculated from the following:    Height as of this encounter: 5' 5\" (1.651 m).    Weight as of this encounter: 348 lb (157.9 kg).  BP completed using cuff size: X-large    Health Maintenance that is potentially due pending provider review:  Health Maintenance Due   Topic Date Due     INFLUENZA VACCINE (SYSTEM ASSIGNED)  09/01/2017         declined  "

## 2018-03-12 ENCOUNTER — OFFICE VISIT (OUTPATIENT)
Dept: FAMILY MEDICINE | Facility: CLINIC | Age: 53
End: 2018-03-12
Payer: COMMERCIAL

## 2018-03-12 VITALS
BODY MASS INDEX: 48.82 KG/M2 | RESPIRATION RATE: 16 BRPM | HEART RATE: 98 BPM | SYSTOLIC BLOOD PRESSURE: 142 MMHG | TEMPERATURE: 98.5 F | OXYGEN SATURATION: 97 % | DIASTOLIC BLOOD PRESSURE: 88 MMHG | HEIGHT: 65 IN | WEIGHT: 293 LBS

## 2018-03-12 DIAGNOSIS — N30.90 CYSTITIS: ICD-10-CM

## 2018-03-12 DIAGNOSIS — R30.0 DYSURIA: Primary | ICD-10-CM

## 2018-03-12 PROBLEM — E66.01 MORBID OBESITY (H): Status: ACTIVE | Noted: 2018-03-12

## 2018-03-12 LAB
ALBUMIN UR-MCNC: NEGATIVE MG/DL
APPEARANCE UR: CLEAR
BACTERIA #/AREA URNS HPF: ABNORMAL /HPF
BILIRUB UR QL STRIP: NEGATIVE
COLOR UR AUTO: YELLOW
GLUCOSE UR STRIP-MCNC: NEGATIVE MG/DL
HGB UR QL STRIP: ABNORMAL
KETONES UR STRIP-MCNC: NEGATIVE MG/DL
LEUKOCYTE ESTERASE UR QL STRIP: ABNORMAL
NITRATE UR QL: NEGATIVE
NON-SQ EPI CELLS #/AREA URNS LPF: ABNORMAL /LPF
PH UR STRIP: 5.5 PH (ref 5–7)
RBC #/AREA URNS AUTO: ABNORMAL /HPF
SOURCE: ABNORMAL
SP GR UR STRIP: 1.02 (ref 1–1.03)
UROBILINOGEN UR STRIP-ACNC: 0.2 EU/DL (ref 0.2–1)
WBC #/AREA URNS AUTO: ABNORMAL /HPF

## 2018-03-12 PROCEDURE — 99213 OFFICE O/P EST LOW 20 MIN: CPT | Performed by: PHYSICIAN ASSISTANT

## 2018-03-12 PROCEDURE — 81001 URINALYSIS AUTO W/SCOPE: CPT | Performed by: PHYSICIAN ASSISTANT

## 2018-03-12 RX ORDER — SULFAMETHOXAZOLE/TRIMETHOPRIM 800-160 MG
1 TABLET ORAL 2 TIMES DAILY
Qty: 14 TABLET | Refills: 0 | Status: SHIPPED | OUTPATIENT
Start: 2018-03-12 | End: 2018-09-28

## 2018-03-12 NOTE — MR AVS SNAPSHOT
"              After Visit Summary   3/12/2018    Neelam Tovar    MRN: [id number]           Patient Information     Date Of Birth          1965        Visit Information        Provider Department      3/12/2018 3:00 PM Silver Nelson PA-C [facility]        Today's Diagnoses     Dysuria    -  1    Cystitis           Follow-ups after your visit        Follow-up notes from your care team     Return if symptoms worsen or fail to improve.      Who to contact     If you have questions or need follow up information about today's clinic visit or your schedule please contact [facility] directly at [phone].  Normal or non-critical lab and imaging results will be communicated to you by KDPOFhart, letter or phone within 4 business days after the clinic has received the results. If you do not hear from us within 7 days, please contact the clinic through [employer] or phone. If you have a critical or abnormal lab result, we will notify you by phone as soon as possible.  Submit refill requests through [employer] or call your pharmacy and they will forward the refill request to us. Please allow 3 business days for your refill to be completed.          Additional Information About Your Visit        MyChart Information     [employer] gives you secure access to your electronic health record. If you see a primary care provider, you can also send messages to your care team and make appointments. If you have questions, please call your primary care clinic.  If you do not have a primary care provider, please call [phone] and they will assist you.        Care EveryWhere ID     This is your Care EveryWhere ID. This could be used by other organizations to access your [place] medical records  [id number]        Your Vitals Were     Pulse Temperature Respirations Height Last Period Pulse Oximetry    98 98.5  F (36.9  C) (Oral) 16 5' 5\" (1.651 m) 02/26/2018 97%    BMI (Body Mass Index)                "    56.75 kg/m2            Blood Pressure from Last 3 Encounters:   03/12/18 142/88   02/13/18 132/78   01/12/18 (!) 148/96    Weight from Last 3 Encounters:   03/12/18 (!) 341 lb (154.7 kg)   02/13/18 (!) 348 lb (157.9 kg)   01/12/18 (!) 348 lb (157.9 kg)              We Performed the Following     UA with Microscopic reflex to Culture          Today's Medication Changes          These changes are accurate as of 3/12/18 11:59 PM.  If you have any questions, ask your nurse or doctor.               Start taking these medicines.        Dose/Directions    sulfamethoxazole-trimethoprim 800-160 MG per tablet   Commonly known as:  BACTRIM DS/SEPTRA DS   Used for:  Dysuria   Started by:  Silver Nelson PA-C        Dose:  1 tablet   Take 1 tablet by mouth 2 times daily   Quantity:  14 tablet   Refills:  0            Where to get your medicines      Some of these will need a paper prescription and others can be bought over the counter.  Ask your nurse if you have questions.     Bring a paper prescription for each of these medications     sulfamethoxazole-trimethoprim 800-160 MG per tablet                Primary Care Provider Office Phone # Fax #    Madelin Davis [phone]       [facility] [address]        Equal Access to Services     HOLLY KELLER AH: Hadii amy reynolds hadasho Soomaali, waaxda luqadaha, qaybta kaalmada adeegyada, christiano horner. So [facility] [phone].    ATENCIÓN: Si habla español, tiene a souza disposición servicios gratuitos de asistencia lingüística. Lionel al [phone].    We comply with applicable federal civil rights laws and [place] laws. We do not discriminate on the basis of race, color, national origin, age, disability, sex, sexual orientation, or gender identity.            Thank you!     Thank you for choosing [facility]  for your care. Our goal is always to provide you with excellent care. Hearing back from our  patients is one way we can continue to improve our services. Please take a few minutes to complete the written survey that you may receive in the mail after your visit with us. Thank you!             Your Updated Medication List - Protect others around you: Learn how to safely use, store and throw away your medicines at www.disposemymeds.org.          This list is accurate as of 3/12/18 11:59 PM.  Always use your most recent med list.                   Brand Name Dispense Instructions for use Diagnosis    clobetasol 0.05 % ointment    TEMOVATE    30 g    Apply sparingly to affected area twice daily for 14 days.  Do not apply to face.    Circumscribed scleroderma       fluticasone 50 MCG/ACT spray    FLONASE     Spray into both nostrils daily        labetalol 200 MG tablet    NORMODYNE    180 tablet    TAKE 1 TABLET(200 MG) BY MOUTH TWICE DAILY    Essential hypertension with goal blood pressure less than 140/90       sulfamethoxazole-trimethoprim 800-160 MG per tablet    BACTRIM DS/SEPTRA DS    14 tablet    Take 1 tablet by mouth 2 times daily    Dysuria

## 2018-03-12 NOTE — NURSING NOTE
"Chief Complaint   Patient presents with     UTI     /88  Pulse 98  Temp 98.5  F (36.9  C) (Oral)  Resp 16  Ht 5' 5\" (1.651 m)  Wt (!) 341 lb (154.7 kg)  LMP 02/26/2018  SpO2 97%  BMI 56.75 kg/m2 Estimated body mass index is 56.75 kg/(m^2) as calculated from the following:    Height as of this encounter: 5' 5\" (1.651 m).    Weight as of this encounter: 341 lb (154.7 kg).  bp completed using cuff size: large       Health Maintenance addressed:  NONE    n/a    Mary Alberts MA     "

## 2018-05-04 ENCOUNTER — TELEPHONE (OUTPATIENT)
Dept: FAMILY MEDICINE | Facility: CLINIC | Age: 53
End: 2018-05-04

## 2018-05-04 NOTE — TELEPHONE ENCOUNTER
Sent patient Mychart message:  Dr. Susan Franz does need to see you for blood pressure follow up.  Your appointment with Jeffery Nelson was almost 2 months ago and blood pressure was not addressed.  Please call [phone] to schedule an appointment.  Reema Ibanez RN

## 2018-05-04 NOTE — TELEPHONE ENCOUNTER
Reason for Call:  Other call back    Detailed comments: Pt is supposed to follow up with a BP check.  Pt said that her BP seems to be fine and she was just in clinic in March to see Leslie (03/12/2018).  She is wondering if she can wait a bit before coming in for the next BP check?  PT is ok with Mychart response.      Phone Number Patient can be reached at: Home number on file [phone] (home)    Best Time: Any    Can we leave a detailed message on this number? YES    Call taken on 5/4/2018 at 4:23 PM by Chanel Garcia

## 2018-09-28 ENCOUNTER — OFFICE VISIT (OUTPATIENT)
Dept: FAMILY MEDICINE | Facility: CLINIC | Age: 53
End: 2018-09-28
Payer: COMMERCIAL

## 2018-09-28 VITALS
TEMPERATURE: 98.7 F | BODY MASS INDEX: 48.82 KG/M2 | WEIGHT: 293 LBS | HEIGHT: 65 IN | SYSTOLIC BLOOD PRESSURE: 138 MMHG | HEART RATE: 83 BPM | DIASTOLIC BLOOD PRESSURE: 82 MMHG | OXYGEN SATURATION: 99 %

## 2018-09-28 DIAGNOSIS — L94.0 CIRCUMSCRIBED SCLERODERMA: ICD-10-CM

## 2018-09-28 DIAGNOSIS — L30.9 ECZEMA, UNSPECIFIED TYPE: ICD-10-CM

## 2018-09-28 DIAGNOSIS — B35.1 ONYCHOMYCOSIS: Primary | ICD-10-CM

## 2018-09-28 DIAGNOSIS — I10 ESSENTIAL HYPERTENSION WITH GOAL BLOOD PRESSURE LESS THAN 140/90: ICD-10-CM

## 2018-09-28 LAB
ALBUMIN SERPL-MCNC: 3.8 G/DL (ref 3.4–5)
ALP SERPL-CCNC: 91 U/L (ref 40–150)
ALT SERPL W P-5'-P-CCNC: 18 U/L (ref 0–50)
ANION GAP SERPL CALCULATED.3IONS-SCNC: 11 MMOL/L (ref 3–14)
AST SERPL W P-5'-P-CCNC: 9 U/L (ref 0–45)
BILIRUB SERPL-MCNC: 0.4 MG/DL (ref 0.2–1.3)
BUN SERPL-MCNC: 15 MG/DL (ref 7–30)
CALCIUM SERPL-MCNC: 8.8 MG/DL (ref 8.5–10.1)
CHLORIDE SERPL-SCNC: 108 MMOL/L (ref 94–109)
CHOLEST SERPL-MCNC: 194 MG/DL
CO2 SERPL-SCNC: 23 MMOL/L (ref 20–32)
CREAT SERPL-MCNC: 0.75 MG/DL (ref 0.52–1.04)
GFR SERPL CREATININE-BSD FRML MDRD: 81 ML/MIN/1.7M2
GLUCOSE SERPL-MCNC: 100 MG/DL (ref 70–99)
HDLC SERPL-MCNC: 66 MG/DL
LDLC SERPL CALC-MCNC: 111 MG/DL
NONHDLC SERPL-MCNC: 128 MG/DL
POTASSIUM SERPL-SCNC: 4.1 MMOL/L (ref 3.4–5.3)
PROT SERPL-MCNC: 7.3 G/DL (ref 6.8–8.8)
SODIUM SERPL-SCNC: 142 MMOL/L (ref 133–144)
TRIGL SERPL-MCNC: 83 MG/DL

## 2018-09-28 PROCEDURE — 80061 LIPID PANEL: CPT | Performed by: FAMILY MEDICINE

## 2018-09-28 PROCEDURE — 99214 OFFICE O/P EST MOD 30 MIN: CPT | Performed by: FAMILY MEDICINE

## 2018-09-28 PROCEDURE — 80053 COMPREHEN METABOLIC PANEL: CPT | Performed by: FAMILY MEDICINE

## 2018-09-28 PROCEDURE — 36415 COLL VENOUS BLD VENIPUNCTURE: CPT | Performed by: FAMILY MEDICINE

## 2018-09-28 RX ORDER — CLOBETASOL PROPIONATE 0.5 MG/G
OINTMENT TOPICAL
Qty: 30 G | Refills: 3 | Status: SHIPPED | OUTPATIENT
Start: 2018-09-28 | End: 2020-04-22

## 2018-09-28 RX ORDER — LABETALOL 200 MG/1
TABLET, FILM COATED ORAL
Qty: 180 TABLET | Refills: 3 | Status: SHIPPED | OUTPATIENT
Start: 2018-09-28 | End: 2020-04-22

## 2018-09-28 NOTE — PATIENT INSTRUCTIONS
PLEASE CALL TO SCHEDULE YOUR MAMMOGRAM  [facility] [phone]  [facility] [place] [phone]  [facility]   [phone]

## 2018-09-28 NOTE — PROGRESS NOTES
SUBJECTIVE:   Neelam Tovar is a 53 year old female who presents to clinic today for the following health issues:    Patient request: Would like a referral to podiatry     Hypertension Follow-up      Outpatient blood pressures are not being checked.    Low Salt Diet: no added salt      Amount of exercise or physical activity: 2-3 days/week for an average of 15-30 minutes    Problems taking medications regularly: No    Medication side effects: none    Diet: regular (no restrictions), low sugar, and low salt        2) wants to discuss possible ADD< she has had troubles with focus and attention all her life but has never been on meds and never been evaluated for this , it is just hard for her with job and school ( she just finished her masters degree ) and taking care of a child with autism   3) feet issues - toe nail changes and some calluses , redness too , thinks one of the big toenails might be ingrown   4) eczema on finger tips , saw derm a yr ago and she gave her a different steroid cream but she did nto use , wondering if she can use the one for the lichen sclerosis or not       Problem list and histories reviewed & adjusted, as indicated.  Additional history: as documented    Patient Active Problem List   Diagnosis     Other acne     Obstructive sleep apnea     Allergic rhinitis due to other allergen     Lichen sclerosis of vulva     CARDIOVASCULAR SCREENING; LDL GOAL LESS THAN 160     Hypertension goal BP (blood pressure) < 140/90     Allergic rhinitis due to pollen     Morbid obesity (H)     Past Surgical History:   Procedure Laterality Date     EXCIS CHALAZION,SINGLE       HC TOOTH EXTRACTION W/FORCEP         Social History   Substance Use Topics     Smoking status: Never Smoker     Smokeless tobacco: Never Used     Alcohol use 0.0 oz/week     0 Standard drinks or equivalent per week      Comment: 2 drinks per week     Family History   Problem Relation Age of Onset     C.A.D. Mother      Diabetes  Mother      Hypertension Mother      Cancer Mother      pancreatic      C.A.D. Father      Hypertension Father      Respiratory Father      sleep apnea     Cancer Maternal Grandmother      Hypertension Sister          Current Outpatient Prescriptions   Medication Sig Dispense Refill     clobetasol (TEMOVATE) 0.05 % ointment Apply sparingly to affected area twice daily for 14 days.  Do not apply to face. 30 g 3     fluticasone (FLONASE) 50 MCG/ACT spray Spray into both nostrils daily       labetalol (NORMODYNE) 200 MG tablet TAKE 1 TABLET(200 MG) BY MOUTH TWICE DAILY 180 tablet 3     [DISCONTINUED] labetalol (NORMODYNE) 200 MG tablet TAKE 1 TABLET(200 MG) BY MOUTH TWICE DAILY 180 tablet 3     Allergies   Allergen Reactions     Dust Mites      Recent Labs   Lab Test  01/12/18   1446  09/28/16   1615  06/24/14   1518   01/28/11   1116   LDL   --   118*   --    --   107   HDL   --   57   --    --   49*   TRIG   --   133   --    --   94   ALT  22  19   --    --    --    CR  0.77  0.70  0.91   < >  0.75   GFRESTIMATED  79  89  66   < >  84   GFRESTBLACK  >90  >90  African American GFR Calc    80   < >  >90   POTASSIUM  3.9  3.9  4.2   < >  4.4   TSH  1.97   --   2.26   --   3.30    < > = values in this interval not displayed.      BP Readings from Last 3 Encounters:   09/28/18 138/82   03/12/18 142/88   02/13/18 132/78    Wt Readings from Last 3 Encounters:   09/28/18 (!) 345 lb 3.2 oz (156.6 kg)   03/12/18 (!) 341 lb (154.7 kg)   02/13/18 (!) 348 lb (157.9 kg)                  Labs reviewed in EPIC    Reviewed and updated as needed this visit by clinical staff       Reviewed and updated as needed this visit by Provider         ROS:  Constitutional, HEENT, cardiovascular, pulmonary, GI, , musculoskeletal, neuro, skin, endocrine and psych systems are negative, except as otherwise noted.    OBJECTIVE:     /82 (BP Location: Right arm, Patient Position: Chair, Cuff Size: Adult Large)  Pulse 83  Temp 98.7  F (37.1  " C) (Oral)  Ht 5' 5\" (1.651 m)  Wt (!) 345 lb 3.2 oz (156.6 kg)  SpO2 99%  Breastfeeding? No  BMI 57.44 kg/m2  Body mass index is 57.44 kg/(m^2).  GENERAL: healthy, alert and no distress  EYES: Eyes grossly normal to inspection, PERRL and conjunctivae and sclerae normal  HENT: ear canals and TM's normal, nose and mouth without ulcers or lesions  NECK: no adenopathy, no asymmetry, masses, or scars and thyroid normal to palpation  RESP: lungs clear to auscultation - no rales, rhonchi or wheezes  CV: regular rate and rhythm, normal S1 S2, no S3 or S4, no murmur, click or rub, no peripheral edema and peripheral pulses strong  ABDOMEN: soft, nontender, no hepatosplenomegaly, no masses and bowel sounds normal  MS: no gross musculoskeletal defects noted, no edema  SKIN: no suspicious lesions or rashes EXCEPT eczema on the fingertips , more on the left hand, she does have onychomycosis on the toenails both big ones and the second toenail on the right foot , heel calluses alexia.    Diagnostic Test Results:  No results found for this or any previous visit (from the past 24 hour(s)).    ASSESSMENT/PLAN:             1. Essential hypertension with goal blood pressure less than 140/90  She is due for labs and also will continue on the labetalol , she denies any side effects from it   - labetalol (NORMODYNE) 200 MG tablet; TAKE 1 TABLET(200 MG) BY MOUTH TWICE DAILY  Dispense: 180 tablet; Refill: 3  - Fecal colorectal cancer screen (FIT); Future  - Lipid Profile (Chol, Trig, HDL, LDL calc)  - Comprehensive metabolic panel (BMP + Alb, Alk Phos, ALT, AST, Total. Bili, TP)    2. Lichen sclerosis of vulva  She uses this when she has the lichen sclerosis exacerbation , currently only twice a week   - clobetasol (TEMOVATE) 0.05 % ointment; Apply sparingly to affected area twice daily for 14 days.  Do not apply to face.  Dispense: 30 g; Refill: 3    3. Onychomycosis  Discussed different treatments, local penlac which does not help much " but contains it form spreading , vs oral meds , monitoring of liver function and how long to take , pt will wait on this for now   - PODIATRY/FOOT & ANKLE SURGERY REFERRAL    4. Eczema - will try using the clobetazole sparingly on the fingertips and then not more than two weeks at a time , RTC if no improving or worsening.    RTC if no improving or worsening.  For the BP if it is well controlled at home once q 6 months , Pt is aware  and comfortable with the current plan.      Madelin Davis MD  [facility]

## 2018-09-28 NOTE — MR AVS SNAPSHOT
After Visit Summary   9/28/2018    Neelam Tovar    MRN: [id number]           Patient Information     Date Of Birth          1965        Visit Information        Provider Department      9/28/2018 8:30 AM Madelin Davis MD [facility]        Today's Diagnoses     Onychomycosis    -  1    Essential hypertension with goal blood pressure less than 140/90        Lichen sclerosis of vulva          Care Instructions    PLEASE CALL TO SCHEDULE YOUR MAMMOGRAM  [facility] [phone]  [facility] [phone]  [facility]   [phone]              Follow-ups after your visit        Additional Services     PODIATRY/FOOT & ANKLE SURGERY REFERRAL       Your provider has referred you to: [facility]: [facility] [phone]   [email or web address]    Please be aware that coverage of these services is subject to the terms and limitations of your health insurance plan.  Call member services at your health plan with any benefit or coverage questions.      Please bring the following to your appointment:  >>   Any x-rays, CTs or MRIs which have been performed.  Contact the facility where they were done to arrange for  prior to your scheduled appointment.    >>   List of current medications   >>   This referral request   >>   Any documents/labs given to you for this referral                  Future tests that were ordered for you today     Open Future Orders        Priority Expected Expires Ordered    Fecal colorectal cancer screen (FIT) Routine 10/19/2018 12/21/2018 9/28/2018            Who to contact     If you have questions or need follow up information about today's clinic visit or your schedule please contact [facility] directly at [phone].  Normal or non-critical lab and imaging results will be communicated to you by MyChart, letter or phone within 4 business  "days after the clinic has received the results. If you do not hear from us within 7 days, please contact the clinic through [facility] or phone. If you have a critical or abnormal lab result, we will notify you by phone as soon as possible.  Submit refill requests through [employer] or call your pharmacy and they will forward the refill request to us. Please allow 3 business days for your refill to be completed.          Additional Information About Your Visit        [facility] Information     [facility] gives you secure access to your electronic health record. If you see a primary care provider, you can also send messages to your care team and make appointments. If you have questions, please call your primary care clinic.  If you do not have a primary care provider, please call [phone] and they will assist you.        Care EveryWhere ID     This is your Care EveryWhere ID. This could be used by other organizations to access your [place] medical records  [id number]        Your Vitals Were     Pulse Temperature Height Pulse Oximetry Breastfeeding? BMI (Body Mass Index)    83 98.7  F (37.1  C) (Oral) 5' 5\" (1.651 m) 99% No 57.44 kg/m2       Blood Pressure from Last 3 Encounters:   09/28/18 138/82   03/12/18 142/88   02/13/18 132/78    Weight from Last 3 Encounters:   09/28/18 (!) 345 lb 3.2 oz (156.6 kg)   03/12/18 (!) 341 lb (154.7 kg)   02/13/18 (!) 348 lb (157.9 kg)              We Performed the Following     Comprehensive metabolic panel (BMP + Alb, Alk Phos, ALT, AST, Total. Bili, TP)     Lipid Profile (Chol, Trig, HDL, LDL calc)     PODIATRY/FOOT & ANKLE SURGERY REFERRAL          Where to get your medicines      These medications were sent to [email or web address] [address] [place] [address] AT [address] [place] [address] & [address] [address]     Phone:  [phone]     clobetasol 0.05 % ointment    labetalol 200 MG tablet          Primary Care Provider Office Phone # Fax #    " Madelin Davis [phone]       [address] [facility] [facility]   [facility] [place] [address]        Equal Access to Services     HOLLY KELLER : Hadrobb amy reynolds ольга Sovaleriano, wafelixda luqadaha, qaybta kaalmada juan, christiano galicia laCherylekike horner. So [facility] [phone].    ATENCIÓN: Si habla español, tiene a souza disposición servicios gratuitos de asistencia lingüística. Llame al [phone].    We comply with applicable federal civil rights laws and [place] laws. We do not discriminate on the basis of race, color, national origin, age, disability, sex, sexual orientation, or gender identity.            Thank you!     Thank you for choosing [facility]  for your care. Our goal is always to provide you with excellent care. Hearing back from our patients is one way we can continue to improve our services. Please take a few minutes to complete the written survey that you may receive in the mail after your visit with us. Thank you!             Your Updated Medication List - Protect others around you: Learn how to safely use, store and throw away your medicines at www.disposemymeds.org.          This list is accurate as of 9/28/18  9:06 AM.  Always use your most recent med list.                   Brand Name Dispense Instructions for use Diagnosis    clobetasol 0.05 % ointment    TEMOVATE    30 g    Apply sparingly to affected area twice daily for 14 days.  Do not apply to face.    Circumscribed scleroderma       fluticasone 50 MCG/ACT spray    FLONASE     Spray into both nostrils daily        labetalol 200 MG tablet    NORMODYNE    180 tablet    TAKE 1 TABLET(200 MG) BY MOUTH TWICE DAILY    Essential hypertension with goal blood pressure less than 140/90

## 2018-10-11 ENCOUNTER — OFFICE VISIT (OUTPATIENT)
Dept: PODIATRY | Facility: CLINIC | Age: 53
End: 2018-10-11
Payer: COMMERCIAL

## 2018-10-11 VITALS
BODY MASS INDEX: 48.82 KG/M2 | DIASTOLIC BLOOD PRESSURE: 76 MMHG | WEIGHT: 293 LBS | SYSTOLIC BLOOD PRESSURE: 130 MMHG | HEIGHT: 65 IN

## 2018-10-11 DIAGNOSIS — B35.1 ONYCHOMYCOSIS: ICD-10-CM

## 2018-10-11 DIAGNOSIS — L60.0 ONYCHOCRYPTOSIS: Primary | ICD-10-CM

## 2018-10-11 DIAGNOSIS — L60.3 DYSTROPHIC NAIL: ICD-10-CM

## 2018-10-11 PROCEDURE — 11719 TRIM NAIL(S) ANY NUMBER: CPT | Performed by: PODIATRIST

## 2018-10-11 PROCEDURE — 99203 OFFICE O/P NEW LOW 30 MIN: CPT | Mod: 25 | Performed by: PODIATRIST

## 2018-10-11 NOTE — LETTER
"    10/11/2018         RE: Neelam Tovar  [address]  [place] [address]        Dear Colleague,    Thank you for referring your patient, Neelam Tovar, to the [facility]. Please see a copy of my visit note below.    Foot & Ankle Surgery  October 11, 2018    CC: \"poss ingrown toenails/fungus?\"    I was asked to see Neelam Tovar regarding the chief complaint by:  Dr NICKI Davis    HPI:  Pt is a 53 year old female who presents with above complaint.  Bilateral lower extremity issues x 3 weeks.  \"corner of big toes brittany rt foot/thick nails brittany small toes\".  Pain 5/10 \"when I touch/hit toe\".  Worse with certain shoes.  No previous issues, but was doing PT and using an insert and toes became painful.      ROS:   Pos for CC.  The patient denies current nausea, vomiting, chills, fevers, belly pain, calf pain, chest pain or SOB.  Complete remainder of ROS is otherwise neg.    VITALS:    Vitals:    10/11/18 1557   BP: 130/76   Weight: (!) 345 lb (156.5 kg)   Height: 5' 5\" (1.651 m)       PMH:    Past Medical History:   Diagnosis Date     Allergic rhinitis due to other allergen      HTN      Lichen sclerosis of vulva 8/07    need q 6 month review indefinately     Obstructive sleep apnea (adult) (pediatric)     CPAP - 9 cm        SXHX:    Past Surgical History:   Procedure Laterality Date     EXCIS CHALAZION,SINGLE       HC TOOTH EXTRACTION W/FORCEP          MEDS:    Current Outpatient Prescriptions   Medication     clobetasol (TEMOVATE) 0.05 % ointment     fluticasone (FLONASE) 50 MCG/ACT spray     labetalol (NORMODYNE) 200 MG tablet     No current facility-administered medications for this visit.        ALL:     Allergies   Allergen Reactions     Dust Mites        FMH:    Family History   Problem Relation Age of Onset     C.A.D. Mother      Diabetes Mother      Hypertension Mother      Cancer Mother      pancreatic      C.A.D. Father      Hypertension Father      Respiratory Father "      sleep apnea     Cancer Maternal Grandmother      Hypertension Sister        SocHx:    Social History     Social History     Marital status: Single     Spouse name: N/A     Number of children: 1     Years of education: 18     Occupational History     Not on file.     Social History Main Topics     Smoking status: Never Smoker     Smokeless tobacco: Never Used     Alcohol use 0.0 oz/week     0 Standard drinks or equivalent per week      Comment: 2 drinks per week     Drug use: No     Sexual activity: Not Currently     Partners: Male     Other Topics Concern     Not on file     Social History Narrative    Social Documentation: 10/28/2009    Balanced Diet: YES, pt has been looking at going gluten free    Calcium intake: 2 per day    Caffeine: 1-2 per day    Exercise:  type of activity swimming;  2 times per week    Sunscreen: Yes,  sometimes    Seatbelts:  Yes    Self Breast Exam:  Yes    Self Testicular Exam: n/a    Physical/Emotional/Sexual Abuse: No    Do you feel safe in your environment? Yes        Cholesterol screen up to date: NO    Eye Exam up to date: 2.5-3 yrs ago    Dental Exam up to date: Yes    Pap smear up to date: Pt sees OB    Mammogram up to date: No: needs referral    Dexa Scan up to date: Does Not Apply    Colonoscopy up to date: Does Not Apply    Immunizations up to date: Yes    Glucose screen if over 40:  No            Isaura Page CMA                               EXAMINATION:  Gen:   No apparent distress  Neuro:   A&Ox3, no deficits  Psych:    Answering questions appropriately for age and situation with normal affect  Head:    NCAT  Eye:    Visual scanning without deficit  Ear:    Response to auditory stimuli wnl  Lung:    Non-labored breathing on RA noted  Abd:    NTND per patient report  Lymph:    Neg for pitting/non-pitting edema BLE  Vasc:    Pulses palpable, CFT minimally delayed  Neuro:    Light touch sensation intact to all sensory nerve distributions without paresthesias  Derm:     Onychocryptosis bilateral border R hallux.  Nails dystrophic/mycotic, including 5th nails bilateral.    MSK:    ROM, strength wnl without limitation, no pain on palpation noted.  Calf:    Neg for redness, swelling or tenderness    Assessment:  53 year old female with onychocryptosis bilateral border R hallux;       Plan:  Discussed etiologies, anatomy and options  1.  Onychocryptosis bilateral border R hallux  -slantbacks performed, leading edge  and removed with nail clipper.  -consider avulsion if debridement is not sufficient  -nail care handout for assistance with nail debridement    2.  Mycotic/dystrophic lesser nails  -discussed PO vs topical options for nails fungus; declined   -nail care handout dispensed for assistance with nail debridement    Follow up:  prn or sooner with acute issues      Patient's medical history was reviewed today    Body mass index is 57.41 kg/(m^2).  Weight management plan: Patient was referred to their PCP to discuss a diet and exercise plan.        Jose M Ospina DPM FACFAS FACFAOM  Podiatric Foot & Ankle Surgeon  [facility]  [phone]        Again, thank you for allowing me to participate in the care of your patient.        Sincerely,        Jose M Ospina DPM, KOREY

## 2018-10-11 NOTE — MR AVS SNAPSHOT
After Visit Summary   10/11/2018    Neelam Tovar    MRN: [id number]           Patient Information     Date Of Birth          1965        Visit Information        Provider Department      10/11/2018 3:45 PM Jose M Guadarrama DPM [facility]        Care Instructions    Thank you for choosing [facility] / [facility]!    DR. GUADARRAMA'S CLINIC LOCATIONS:   MONDAY - [place] TUESDAY - [place]   [address] [facility]  [address]   [place] [address] [place] [address]   [phone]       THURSDAY AM - [place] THURSDAY PM - [facility]   [address]   [place] [address] [place], [place] [address] [phone]       FRIDAY AM - [place] SET UP SURGERY: [phone] [address] APPOINTMENTS: [phone]   [place] [address] BILLING QUESTIONS: [phone] FAX NUMBER: [phone]     Follow Up: As needed    FOOT CARE NURSES  If you are interested in having a foot care nurse come out to your   home, please call one of these contacts for more information:  [facility]  [phone] [facility]  [phone]   [facility]  [phone]  [facility] [phone]  [place]   [facility]  [phone]  At [place] [facility] [phone]         Body Mass Index (BMI)  Many things can cause foot and ankle problems. Foot structure, activity level, foot mechanics and injuries are common causes of pain. One very important issue that often goes unmentioned, is body weight. Extra weight can cause increased stress on muscles, ligaments, bones and tendons. Sometimes just a few extra pounds is all it takes to put one over her/his threshold. Without reducing that stress, it can be difficult to alleviate pain. Some people are uncomfortable addressing this issue, but we feel it is important for you to think about  "it. As Foot &  Ankle specialists, our job is addressing the lower extremity problem and possible causes. Regarding extra body weight, we encourage patients to discuss diet and weight management plans with their primary care doctors. It is this team approach that gives you the best opportunity for pain relief and getting you back on your feet.              Follow-ups after your visit        Who to contact     If you have questions or need follow up information about today's clinic visit or your schedule please contact [facility] directly at [phone].  Normal or non-critical lab and imaging results will be communicated to you by Wordeohart, letter or phone within 4 business days after the clinic has received the results. If you do not hear from us within 7 days, please contact the clinic through [employer] or phone. If you have a critical or abnormal lab result, we will notify you by phone as soon as possible.  Submit refill requests through [employer] or call your pharmacy and they will forward the refill request to us. Please allow 3 business days for your refill to be completed.          Additional Information About Your Visit        WordeoharAlminder Information     [employer] gives you secure access to your electronic health record. If you see a primary care provider, you can also send messages to your care team and make appointments. If you have questions, please call your primary care clinic.  If you do not have a primary care provider, please call [phone] and they will assist you.        Care EveryWhere ID     This is your Care EveryWhere ID. This could be used by other organizations to access your [place] medical records  [id number]        Your Vitals Were     Height BMI (Body Mass Index)                5' 5\" (1.651 m) 57.41 kg/m2           Blood Pressure from Last 3 Encounters:   10/11/18 130/76   09/28/18 138/82   03/12/18 142/88    Weight from Last 3 Encounters:   10/11/18 (!) 345 lb (156.5 kg) "   09/28/18 (!) 345 lb 3.2 oz (156.6 kg)   03/12/18 (!) 341 lb (154.7 kg)              Today, you had the following     No orders found for display       Primary Care Provider Office Phone # Fax #    Madelin Davis [phone] [address] [facility]   [facility] [address]        Equal Access to Services     HOLLY KELLER : Hadii aad ku hadasho Soomaali, waaxda luqadaha, qaybta kaalmada adeegyada, waxay idiin hayaan adeeg kharash la'esn . So [facility] [phone].    ATENCIÓN: Si koreyla daja, tiene a souza disposición servicios gratuitos de asistencia lingüística. Llame al [phone].    We comply with applicable federal civil rights laws and [place] laws. We do not discriminate on the basis of race, color, national origin, age, disability, sex, sexual orientation, or gender identity.            Thank you!     Thank you for choosing [facility]  for your care. Our goal is always to provide you with excellent care. Hearing back from our patients is one way we can continue to improve our services. Please take a few minutes to complete the written survey that you may receive in the mail after your visit with us. Thank you!             Your Updated Medication List - Protect others around you: Learn how to safely use, store and throw away your medicines at www.disposemymeds.org.          This list is accurate as of 10/11/18  4:08 PM.  Always use your most recent med list.                   Brand Name Dispense Instructions for use Diagnosis    clobetasol 0.05 % ointment    TEMOVATE    30 g    Apply sparingly to affected area twice daily for 14 days.  Do not apply to face.    Circumscribed scleroderma       fluticasone 50 MCG/ACT spray    FLONASE     Spray into both nostrils daily        labetalol 200 MG tablet    NORMODYNE    180 tablet    TAKE 1 TABLET(200 MG) BY MOUTH TWICE DAILY    Essential hypertension with goal blood pressure less than 140/90

## 2018-10-11 NOTE — PATIENT INSTRUCTIONS
Thank you for choosing [facility]!    DR. GUADARRAMA'S CLINIC LOCATIONS:   MONDAY - [place] TUESDAY - [place]   [address] [facility]  [address]   [place] [address] [place] [address]   [phone]       THURSDAY AM - [place] THURSDAY PM - [place]   [address]   [place] [address] [place], [place] [address] [phone]       FRIDAY AM - [place] SET UP SURGERY: [phone] [address] APPOINTMENTS: [phone]   [place] [address] BILLING QUESTIONS: [phone] FAX NUMBER: [phone]     Follow Up: As needed    FOOT CARE NURSES  If you are interested in having a foot care nurse come out to your   home, please call one of these contacts for more information:  [facility]  [phone] [facility]  [phone]   [facility]  [phone]  [place] [phone]  [place]   [facility]  [phone]  At [facility] [phone]         Body Mass Index (BMI)  Many things can cause foot and ankle problems. Foot structure, activity level, foot mechanics and injuries are common causes of pain. One very important issue that often goes unmentioned, is body weight. Extra weight can cause increased stress on muscles, ligaments, bones and tendons. Sometimes just a few extra pounds is all it takes to put one over her/his threshold. Without reducing that stress, it can be difficult to alleviate pain. Some people are uncomfortable addressing this issue, but we feel it is important for you to think about it. As Foot &  Ankle specialists, our job is addressing the lower extremity problem and possible causes. Regarding extra body weight, we encourage patients to discuss diet and weight management plans with their primary care doctors. It is this team approach that gives you the best opportunity for pain relief  and getting you back on your feet.

## 2018-10-19 NOTE — PROGRESS NOTES
"Foot & Ankle Surgery  October 11, 2018    CC: \"poss ingrown toenails/fungus?\"    I was asked to see Neelam Tovar regarding the chief complaint by:  Dr NICKI Davis    HPI:  Pt is a 53 year old female who presents with above complaint.  Bilateral lower extremity issues x 3 weeks.  \"corner of big toes brittany rt foot/thick nails brittany small toes\".  Pain 5/10 \"when I touch/hit toe\".  Worse with certain shoes.  No previous issues, but was doing PT and using an insert and toes became painful.      ROS:   Pos for CC.  The patient denies current nausea, vomiting, chills, fevers, belly pain, calf pain, chest pain or SOB.  Complete remainder of ROS is otherwise neg.    VITALS:    Vitals:    10/11/18 1557   BP: 130/76   Weight: (!) 345 lb (156.5 kg)   Height: 5' 5\" (1.651 m)       PMH:    Past Medical History:   Diagnosis Date     Allergic rhinitis due to other allergen      HTN      Lichen sclerosis of vulva 8/07    need q 6 month review indefinately     Obstructive sleep apnea (adult) (pediatric)     CPAP - 9 cm        SXHX:    Past Surgical History:   Procedure Laterality Date     EXCIS CHALAZION,SINGLE       HC TOOTH EXTRACTION W/FORCEP          MEDS:    Current Outpatient Prescriptions   Medication     clobetasol (TEMOVATE) 0.05 % ointment     fluticasone (FLONASE) 50 MCG/ACT spray     labetalol (NORMODYNE) 200 MG tablet     No current facility-administered medications for this visit.        ALL:     Allergies   Allergen Reactions     Dust Mites        FMH:    Family History   Problem Relation Age of Onset     C.A.D. Mother      Diabetes Mother      Hypertension Mother      Cancer Mother      pancreatic      C.A.D. Father      Hypertension Father      Respiratory Father      sleep apnea     Cancer Maternal Grandmother      Hypertension Sister        SocHx:    Social History     Social History     Marital status: Single     Spouse name: N/A     Number of children: 1     Years of education: 18     Occupational History     Not " on file.     Social History Main Topics     Smoking status: Never Smoker     Smokeless tobacco: Never Used     Alcohol use 0.0 oz/week     0 Standard drinks or equivalent per week      Comment: 2 drinks per week     Drug use: No     Sexual activity: Not Currently     Partners: Male     Other Topics Concern     Not on file     Social History Narrative    Social Documentation: 10/28/2009    Balanced Diet: YES, pt has been looking at going gluten free    Calcium intake: 2 per day    Caffeine: 1-2 per day    Exercise:  type of activity swimming;  2 times per week    Sunscreen: Yes,  sometimes    Seatbelts:  Yes    Self Breast Exam:  Yes    Self Testicular Exam: n/a    Physical/Emotional/Sexual Abuse: No    Do you feel safe in your environment? Yes        Cholesterol screen up to date: NO    Eye Exam up to date: 2.5-3 yrs ago    Dental Exam up to date: Yes    Pap smear up to date: Pt sees OB    Mammogram up to date: No: needs referral    Dexa Scan up to date: Does Not Apply    Colonoscopy up to date: Does Not Apply    Immunizations up to date: Yes    Glucose screen if over 40:  No            Isaura Page CMA                               EXAMINATION:  Gen:   No apparent distress  Neuro:   A&Ox3, no deficits  Psych:    Answering questions appropriately for age and situation with normal affect  Head:    NCAT  Eye:    Visual scanning without deficit  Ear:    Response to auditory stimuli wnl  Lung:    Non-labored breathing on RA noted  Abd:    NTND per patient report  Lymph:    Neg for pitting/non-pitting edema BLE  Vasc:    Pulses palpable, CFT minimally delayed  Neuro:    Light touch sensation intact to all sensory nerve distributions without paresthesias  Derm:    Onychocryptosis bilateral border R hallux.  Nails dystrophic/mycotic, including 5th nails bilateral.    MSK:    ROM, strength wnl without limitation, no pain on palpation noted.  Calf:    Neg for redness, swelling or tenderness    Assessment:  53 year old  female with onychocryptosis bilateral border R hallux;       Plan:  Discussed etiologies, anatomy and options  1.  Onychocryptosis bilateral border R hallux  -slantbacks performed, leading edge  and removed with nail clipper.  -consider avulsion if debridement is not sufficient  -nail care handout for assistance with nail debridement    2.  Mycotic/dystrophic lesser nails  -discussed PO vs topical options for nails fungus; declined   -nail care handout dispensed for assistance with nail debridement    Follow up:  prn or sooner with acute issues      Patient's medical history was reviewed today    Body mass index is 57.41 kg/(m^2).  Weight management plan: Patient was referred to their PCP to discuss a diet and exercise plan.        Jose M Ospina DPM FACFAS FACFAOM  Podiatric Foot & Ankle Surgeon  [facility]  [phone]

## 2018-10-30 ENCOUNTER — VIRTUAL VISIT (OUTPATIENT)
Dept: FAMILY MEDICINE | Facility: CLINIC | Age: 53
End: 2018-10-30
Payer: COMMERCIAL

## 2018-10-30 DIAGNOSIS — J01.01 ACUTE RECURRENT MAXILLARY SINUSITIS: Primary | ICD-10-CM

## 2018-10-30 PROCEDURE — 99441 ZZC PHYSICIAN TELEPHONE EVALUATION 5-10 MIN: CPT | Performed by: FAMILY MEDICINE

## 2018-10-30 RX ORDER — CEFUROXIME AXETIL 500 MG/1
500 TABLET ORAL 2 TIMES DAILY
Qty: 20 TABLET | Refills: 0 | Status: SHIPPED | OUTPATIENT
Start: 2018-10-30 | End: 2022-06-24

## 2018-10-30 NOTE — PROGRESS NOTES
"Neelam Tovar is a 53 year old female who is being evaluated via a telephone visit.      The patient has been notified of following (by M.A) DEAN Rogersrachel MORAN Lead  [facility]           \"We have found that certain health care needs can be provided without the need for a physical exam.  This service lets us provide the care you need with a short phone conversation.  If a prescription is necessary we can send it directly to your pharmacy.  If lab work is needed we can place an order for that and you can then stop by our lab to have the test done at a later time.    This telephone visit will be conducted via 3 way call with the you (the patient) , the physician/provider, and a me all on the line at the same time.  This allows your physician/provider to have the phone conversation with you while I will be taking notes for your medical record.  We will have full access to your [place] medical record during this entire phone call.    Since this is like an office visit,  will bill your insurance company for this service.  Please check with your medical insurance if this type of telephone/virtual is covered . You may be responsible for the cost of this service if insurance coverage is denied.  The typical cost is $30 (10min), $59(11-20min) and $85 (21-30min)     If during the course of the call the physician/provider feels a telephone visit is not appropriate, you will not be charged for this service\"    Consent has been obtained for this service by care team member: yes.  See the scanned image in the medical record.    S: The history as provided by the patient to the provider during this 3 way call include : Pt has had an URI over a month ago and initially thought to be getting better but sometimes around three weeks ago she started to have maxillary pressure which got worse, now with some facial pressure , headaches which got worse in the past week, her PND is worse in the night and wakes her " up. She has seen ENT a year ago and was Rx Ceftin as Z pack and amoxicillin did not work. She was [facility] couple of [facility] ago and Rx seven days course of amoxicillin and that did not help at all. She finished the course a few days ago now the facial pain is worse , green nasal discharge and cough from PND. She denies any SOB, no wheezing , she does not have any asthma, is not a smoker , she does not have fever .    Pertinent parts of the the patient's medical history reviewed and confirmed by the provider included :     Total time of call between patient and provider was 8 minutes     Madelin Davis M.D. (MD signature)  ===================================================    I have reviewed the note as documented above.  This accurately captures the substance of my conversation with the patient,    Additional provider notes:As above     Assessment/Plan:  (J01.01) Acute recurrent maxillary sinusitis  (primary encounter diagnosis)  Comment: we discussed netti pot, saline rinses, decongestants, flonase and she has been doing all these, so will Rx Ceftin to take twice a day for ten days .  Plan: cefuroxime (CEFTIN) 500 MG tablet        RTC if no improving or worsening.  Pt is aware  and comfortable with the current plan.

## 2018-10-30 NOTE — MR AVS SNAPSHOT
After Visit Summary   10/30/2018    Neelam Tovar    MRN: [id number]           Patient Information     Date Of Birth          1965        Visit Information        Provider Department      10/30/2018 1:00 PM Madelin Davis MD [facility]        Today's Diagnoses     Acute recurrent maxillary sinusitis    -  1       Follow-ups after your visit        Follow-up notes from your care team     Return in about 4 weeks (around 11/27/2018).      Who to contact     If you have questions or need follow up information about today's clinic visit or your schedule please contact [facility] directly at [phone].  Normal or non-critical lab and imaging results will be communicated to you by MyChart, letter or phone within 4 business days after the clinic has received the results. If you do not hear from us within 7 days, please contact the clinic through [employer] or phone. If you have a critical or abnormal lab result, we will notify you by phone as soon as possible.  Submit refill requests through [employer] or call your pharmacy and they will forward the refill request to us. Please allow 3 business days for your refill to be completed.          Additional Information About Your Visit        MyChart Information     [employer] gives you secure access to your electronic health record. If you see a primary care provider, you can also send messages to your care team and make appointments. If you have questions, please call your primary care clinic.  If you do not have a primary care provider, please call [phone] and they will assist you.        Care EveryWhere ID     This is your Care EveryWhere ID. This could be used by other organizations to access your [place] medical records  [id number]         Blood Pressure from Last 3 Encounters:   10/11/18 130/76   09/28/18 138/82   03/12/18 142/88    Weight from Last 3 Encounters:   10/11/18 (!) 345 lb (156.5 kg)   09/28/18 (!) 345 lb 3.2 oz  (156.6 kg)   03/12/18 (!) 341 lb (154.7 kg)              Today, you had the following     No orders found for display         Today's Medication Changes          These changes are accurate as of 10/30/18  7:52 PM.  If you have any questions, ask your nurse or doctor.               Start taking these medicines.        Dose/Directions    cefuroxime 500 MG tablet   Commonly known as:  CEFTIN   Used for:  Acute recurrent maxillary sinusitis        Dose:  500 mg   Take 1 tablet (500 mg) by mouth 2 times daily   Quantity:  20 tablet   Refills:  0            Where to get your medicines      These medications were sent to [facility] [id number] [place] [address] AT [address] [place] [address] & [address] [address]     Phone:  [phone]     cefuroxime 500 MG tablet                Primary Care Provider Office Phone # Fax #    Madelin Davis [phone] [address] [facility] [address] [place] [address]        Equal Access to Services     HOLLY KELLER : Hadii amy ku hadasho Soomaali, waaxda luqadaha, qaybta kaalmada adeegyada, waxay ramiroin chris jacobs . So [facility] [phone].    ATENCIÓN: Si habla español, tiene a souza disposición servicios gratuitos de asistencia lingüística. LlSelect Medical [facility] [phone].    We comply with applicable federal civil rights laws and [place] laws. We do not discriminate on the basis of race, color, national origin, age, disability, sex, sexual orientation, or gender identity.            Thank you!     Thank you for choosing [facility]  for your care. Our goal is always to provide you with excellent care. Hearing back from our patients is one way we can continue to improve our services. Please take a few minutes to complete the written survey that you may receive in the mail after your visit with us. Thank you!             Your Updated Medication List - Protect others around you: Learn how to safely use, store  and throw away your medicines at www.disposemymeds.org.          This list is accurate as of 10/30/18  7:52 PM.  Always use your most recent med list.                   Brand Name Dispense Instructions for use Diagnosis    cefuroxime 500 MG tablet    CEFTIN    20 tablet    Take 1 tablet (500 mg) by mouth 2 times daily    Acute recurrent maxillary sinusitis       clobetasol 0.05 % ointment    TEMOVATE    30 g    Apply sparingly to affected area twice daily for 14 days.  Do not apply to face.    Circumscribed scleroderma       fluticasone 50 MCG/ACT spray    FLONASE     Spray into both nostrils daily        labetalol 200 MG tablet    NORMODYNE    180 tablet    TAKE 1 TABLET(200 MG) BY MOUTH TWICE DAILY    Essential hypertension with goal blood pressure less than 140/90

## 2019-04-18 DIAGNOSIS — I10 ESSENTIAL HYPERTENSION WITH GOAL BLOOD PRESSURE LESS THAN 140/90: ICD-10-CM

## 2019-04-19 RX ORDER — LABETALOL 200 MG/1
TABLET, FILM COATED ORAL
Status: SHIPPED
Start: 2019-04-19 | End: 2022-06-24

## 2019-04-19 NOTE — TELEPHONE ENCOUNTER
"Denied  Too early; Rx sent 9/28/2018 for 1 year  Ro MARTELL RN    Last Written Prescription Date:  9/28/2018  Last Fill Quantity: 180,  # refills: 3   Last office visit: 10/30/2018 with prescribing provider:     Future Office Visit:    Requested Prescriptions   Pending Prescriptions Disp Refills     labetalol (NORMODYNE) 200 MG tablet [Pharmacy Med Name: LABETALOL 200MG TABLETS] 180 tablet 0     Sig: TAKE 1 TABLET(200 MG) BY MOUTH TWICE DAILY       Beta-Blockers Protocol Passed - 4/18/2019  1:39 PM        Passed - Blood pressure under 140/90 in past 12 months     BP Readings from Last 3 Encounters:   10/11/18 130/76   09/28/18 138/82   03/12/18 142/88                 Passed - Patient is age 6 or older        Passed - Recent (12 mo) or future (30 days) visit within the authorizing provider's specialty     Patient had office visit in the last 12 months or has a visit in the next 30 days with authorizing provider or within the authorizing provider's specialty.  See \"Patient Info\" tab in inbasket, or \"Choose Columns\" in Meds & Orders section of the refill encounter.              Passed - Medication is active on med list          "

## 2019-11-02 ENCOUNTER — HEALTH MAINTENANCE LETTER (OUTPATIENT)
Age: 54
End: 2019-11-02

## 2020-02-10 ENCOUNTER — HEALTH MAINTENANCE LETTER (OUTPATIENT)
Age: 55
End: 2020-02-10

## 2020-04-22 ENCOUNTER — VIRTUAL VISIT (OUTPATIENT)
Dept: FAMILY MEDICINE | Facility: CLINIC | Age: 55
End: 2020-04-22
Payer: COMMERCIAL

## 2020-04-22 DIAGNOSIS — I10 ESSENTIAL HYPERTENSION WITH GOAL BLOOD PRESSURE LESS THAN 140/90: Primary | ICD-10-CM

## 2020-04-22 DIAGNOSIS — J30.1 SEASONAL ALLERGIC RHINITIS DUE TO POLLEN: ICD-10-CM

## 2020-04-22 DIAGNOSIS — L94.0 CIRCUMSCRIBED SCLERODERMA: ICD-10-CM

## 2020-04-22 PROCEDURE — 99214 OFFICE O/P EST MOD 30 MIN: CPT | Mod: TEL | Performed by: FAMILY MEDICINE

## 2020-04-22 RX ORDER — CLOBETASOL PROPIONATE 0.5 MG/G
OINTMENT TOPICAL
Qty: 30 G | Refills: 3 | Status: SHIPPED | OUTPATIENT
Start: 2020-04-22 | End: 2021-04-28

## 2020-04-22 RX ORDER — LABETALOL 200 MG/1
TABLET, FILM COATED ORAL
Qty: 180 TABLET | Refills: 3 | Status: SHIPPED | OUTPATIENT
Start: 2020-04-22 | End: 2021-07-09

## 2020-04-22 RX ORDER — FLUTICASONE PROPIONATE 50 MCG
1 SPRAY, SUSPENSION (ML) NASAL DAILY
Qty: 11.1 ML | Refills: 3 | Status: SHIPPED | OUTPATIENT
Start: 2020-04-22 | End: 2021-04-28

## 2020-04-22 NOTE — PROGRESS NOTES
"Neelam Tovar is a 54 year old female who is being evaluated via a billable telephone visit.      The patient has been notified of following:     \"This telephone visit will be conducted via a call between you and your physician/provider. We have found that certain health care needs can be provided without the need for a physical exam.  This service lets us provide the care you need with a short phone conversation.  If a prescription is necessary we can send it directly to your pharmacy.  If lab work is needed we can place an order for that and you can then stop by our lab to have the test done at a later time.    Telephone visits are billed at different rates depending on your insurance coverage. During this emergency period, for some insurers they may be billed the same as an in-person visit.  Please reach out to your insurance provider with any questions.    If during the course of the call the physician/provider feels a telephone visit is not appropriate, you will not be charged for this service.\"    Patient has given verbal consent for Telephone visit?  Yes    How would you like to obtain your AVS? MyChart    Subjective     Neelam Tovar is a 54 year old female who presents to clinic today for the following health issues:    HPI  Hypertension Follow-up- she checks her BP at home and at some point it was above 140 -eusebia 90 but she was taking her labetalol instead of 200 mg twice a day only 100 mg twice a day and then she increased the dose as it was Rx to 200 mg twice a day for the past couple of months , so now BP has been well controlled . No side effects , she is working form home and long hours stress levels is high now with the pandemic , but she is trying to exercise as much as she can every other day or so . Trying to eat healthy as well       Do you check your blood pressure regularly outside of the clinic? Yes     Are you following a low salt diet? No    Are your blood pressures ever more than " 140 on the top number (systolic) OR more   than 90 on the bottom number (diastolic), for example 140/90? No      How many servings of fruits and vegetables do you eat daily?  2-3    On average, how many sweetened beverages do you drink each day (Examples: soda, juice, sweet tea, etc.  Do NOT count diet or artificially sweetened beverages)?   0    How many days per week do you exercise enough to make your heart beat faster? 3 or less    How many minutes a day do you exercise enough to make your heart beat faster? 30 - 60    How many days per week do you miss taking your medication? 0             Patient Active Problem List   Diagnosis     Other acne     Obstructive sleep apnea     Allergic rhinitis due to other allergen     Lichen sclerosis of vulva     CARDIOVASCULAR SCREENING; LDL GOAL LESS THAN 160     Hypertension goal BP (blood pressure) < 140/90     Allergic rhinitis due to pollen     Morbid obesity (H)     Past Surgical History:   Procedure Laterality Date     EXCIS CHALAZION,SINGLE       HC TOOTH EXTRACTION W/FORCEP         Social History     Tobacco Use     Smoking status: Never Smoker     Smokeless tobacco: Never Used   Substance Use Topics     Alcohol use: Yes     Alcohol/week: 0.0 standard drinks     Comment: 2 drinks per week     Family History   Problem Relation Age of Onset     C.A.D. Mother      Diabetes Mother      Hypertension Mother      Cancer Mother         pancreatic      C.A.D. Father      Hypertension Father      Respiratory Father         sleep apnea     Cancer Maternal Grandmother      Hypertension Sister          Current Outpatient Medications   Medication Sig Dispense Refill     cefuroxime (CEFTIN) 500 MG tablet Take 1 tablet (500 mg) by mouth 2 times daily 20 tablet 0     clobetasol (TEMOVATE) 0.05 % external ointment Apply sparingly to affected area twice daily for 14 days.  Do not apply to face. 30 g 3     fluticasone (FLONASE) 50 MCG/ACT nasal spray Spray 1 spray in nostril daily 11.1  mL 3     labetalol (NORMODYNE) 200 MG tablet TAKE 1 TABLET(200 MG) BY MOUTH TWICE DAILY 180 tablet 3     labetalol (NORMODYNE) 200 MG tablet TAKE 1 TABLET(200 MG) BY MOUTH TWICE DAILY       Allergies   Allergen Reactions     Dust Mites      Recent Labs   Lab Test 09/28/18  0912 01/12/18  1446 09/28/16  1615 06/24/14  1518   *  --  118*  --    HDL 66  --  57  --    TRIG 83  --  133  --    ALT 18 22 19  --    CR 0.75 0.77 0.70 0.91   GFRESTIMATED 81 79 89 66   GFRESTBLACK >90 >90 >90   GFR Calc   80   POTASSIUM 4.1 3.9 3.9 4.2   TSH  --  1.97  --  2.26      BP Readings from Last 3 Encounters:   10/11/18 130/76   09/28/18 138/82   03/12/18 142/88    Wt Readings from Last 3 Encounters:   10/11/18 (!) 156.5 kg (345 lb)   09/28/18 (!) 156.6 kg (345 lb 3.2 oz)   03/12/18 (!) 154.7 kg (341 lb)                    Reviewed and updated as needed this visit by Provider         Review of Systems   ROS COMP: Constitutional, HEENT, cardiovascular, pulmonary, GI, , musculoskeletal, neuro, skin, endocrine and psych systems are negative, except as otherwise noted.       Objective   Reported vitals:  There were no vitals taken for this visit.   healthy, alert and no distress  PSYCH: Alert and oriented times 3; coherent speech, normal   rate and volume, able to articulate logical thoughts, able   to abstract reason, no tangential thoughts, no hallucinations   or delusions  Her affect is normal  RESP: No cough, no audible wheezing, able to talk in full sentences  Remainder of exam unable to be completed due to telephone visits    Diagnostic Test Results:  Labs reviewed in Epic  none         Assessment/Plan:  1. Essential hypertension with goal blood pressure less than 140/90  We discussed coming for labs in a month or so , I have refilled her meds   - **Comprehensive metabolic panel FUTURE anytime; Future  - TSH with free T4 reflex; Future  - labetalol (NORMODYNE) 200 MG tablet; TAKE 1 TABLET(200 MG) BY MOUTH  TWICE DAILY  Dispense: 180 tablet; Refill: 3  - Magnesium; Future    2. Lichen sclerosis of vulva  She has used the clobetasol as needed for lichen sclerosis of the vulva , refilled she does not use daily    - clobetasol (TEMOVATE) 0.05 % external ointment; Apply sparingly to affected area twice daily for 14 days.  Do not apply to face.  Dispense: 30 g; Refill: 3    3. Seasonal allergic rhinitis due to pollen  Seems that her allergies are flaring now and zshe needs refills on the flonase , refilled   - fluticasone (FLONASE) 50 MCG/ACT nasal spray; Spray 1 spray in nostril daily  Dispense: 11.1 mL; Refill: 3    RTC if no improving or worsening.  Pt is aware  and comfortable with the current plan.    F/u in three months sooner if any concerns.    Phone call duration:  12 minutes    Madelin Davis MD

## 2020-07-06 ENCOUNTER — TELEPHONE (OUTPATIENT)
Dept: FAMILY MEDICINE | Facility: CLINIC | Age: 55
End: 2020-07-06

## 2020-11-09 ENCOUNTER — MYC MEDICAL ADVICE (OUTPATIENT)
Dept: FAMILY MEDICINE | Facility: CLINIC | Age: 55
End: 2020-11-09

## 2020-11-14 ENCOUNTER — HEALTH MAINTENANCE LETTER (OUTPATIENT)
Age: 55
End: 2020-11-14

## 2021-02-06 ENCOUNTER — HEALTH MAINTENANCE LETTER (OUTPATIENT)
Age: 56
End: 2021-02-06

## 2021-02-25 ENCOUNTER — TELEPHONE (OUTPATIENT)
Dept: FAMILY MEDICINE | Facility: CLINIC | Age: 56
End: 2021-02-25

## 2021-02-25 NOTE — TELEPHONE ENCOUNTER
Patient Quality Outreach 2nd Attempt      Summary:    Type of outreach:    Sent letter.    Next Steps:  Reach out within 90 days via Phone.    Max number of attempts reached: No. Will try again in 90 days if patient still on fail list.    Questions for provider review:    None                                                                                                                    Melanie King MA on 2/25/2021 at 10:41 AM       Chart routed to .

## 2021-02-25 NOTE — LETTER
[facility]  [address]  [facility] [address] [phone]       February 25, 2021    Neelam Tovar  [address]  [facility] [address]    Dear Neelam,    We care about your health and have reviewed your health plan and are making recommendations based on this review, to optimize your health.    You are in particular need of attention regarding:  -Breast Cancer Screening  -Colon Cancer Screening  -Cervical Cancer Screening  -Wellness (Physical) Visit     We are recommending that you:  -schedule a WELLNESS (Physical) APPOINTMENT with Dr. Davis    -schedule a MAMMOGRAM which is due.    1 in 8 women will develop invasive breast cancer during her lifetime and it is the most common non-skin cancer in American women.  EARLY detection, new treatments, and a better understanding of the disease have increased survival rates - the 5 year survival rate in the 1960s was 63% and today it is close to 90%.    PLEASE CALL TO SCHEDULE YOUR MAMMOGRAM  [facility] [phone]  [facility] [phone]  [facility]   [phone]  Central Scheduling (all locations) [phone]    If you are under/uninsured, we recommend you contact the [facility]. They offer mammograms at no charge or on a sliding fee charge. You can schedule with them at [phone]. Please have them send us the results.      Please disregard this reminder if you have had this exam elsewhere within the last year.  It would be helpful for us to have a copy of your mammogram report in your file so that we can best coordinate your care - please contact us with when your test was done so we can update your record.             -schedule a COLONOSCOPY to look for colon cancer (due every 10 years or 5 years in higher risk situations.)        Colon cancer is now the second leading cause of cancer-related deaths in the [place] for both men and women and  there are over 130,000 new cases and 50,000 deaths per year from colon cancer.  Colonoscopies can prevent 90-95% of these deaths.  Problem lesions can be removed before they ever become cancer.  This test is not only looking for cancer, but also getting rid of precancerious lesions.    If you are under/uninsured, we recommend you contact the [employer] program. [employer] is a free colorectal cancer screening program that provides colonoscopies for eligible under/uninsured [place] men and women. If you are interested in receiving a free colonoscopy, please call [employer] at [phone] (mention code [employer]) to see if you re eligible.      If you do not wish to do a colonoscopy or cannot afford to do one, at this time, there is another option. It is called a FIT test or Fecal Immunochemical Occult Blood Test (take home stool sample kit).  It does not replace the colonoscopy for colorectal cancer screening, but it can detect hidden bleeding in the lower colon.  It does need to be repeated every year and if a positive result is obtained, you would be referred for a colonoscopy.          If you have completed either one of these tests at another facility, please call with the details of when and where the tests were done and if they were normal or not. Or have the records sent to our clinic so that we can best coordinate your care.    -schedule a PAP SMEAR EXAM which is due.  Please disregard this reminder if you have had this exam elsewhere within the last year.  It would be helpful for us to have a copy of your recent pap smear report in our file so that we can best coordinate your care.    If you are under/uninsured, we recommend you contact the [facility]. They offer pap smears at no charge or on a sliding fee charge. You can schedule with them at [phone]. Please have them send us the results.      In addition, here is a list of due or overdue Health Maintenance reminders.    Health Maintenance  Due   Topic Date Due     Discuss Advance Care Planning  1965     Hepatitis C Screening  05/23/1984     Zoster (Shingles) Vaccine (1 of 2) 05/23/2016     Preventive Care Visit  09/28/2017     Mammogram  10/11/2017     Colorectal Cancer Screening  01/21/2019     HPV Screening  09/28/2019     PAP Smear  09/28/2019     Flu Vaccine (1) 09/01/2020     PHQ-2  01/01/2021       To address the above recommendations, we encourage you to contact us at [phone], via [employer] or by contacting Central Scheduling toll free at [phone] 24 hours a day. They will assist you with finding the most convenient time and location.    Thank you for trusting [facility] and we appreciate the opportunity to serve you.  We look forward to supporting your healthcare needs in the future.    Healthy Regards,    Your [facility] Team

## 2021-03-05 ENCOUNTER — MYC MEDICAL ADVICE (OUTPATIENT)
Dept: FAMILY MEDICINE | Facility: CLINIC | Age: 56
End: 2021-03-05

## 2021-03-05 DIAGNOSIS — Z12.11 COLON CANCER SCREENING: ICD-10-CM

## 2021-03-05 DIAGNOSIS — Z12.11 COLON CANCER SCREENING: Primary | ICD-10-CM

## 2021-03-05 NOTE — TELEPHONE ENCOUNTER
LS,  Please see below [employer] message and advise.  Pended order for oguakar  Thanks,  Ro MARTELL RN

## 2021-03-08 NOTE — PROGRESS NOTES
This encounter was created solely for the purpose of releasing the future order that was placed for Cologuard.  This is a necessary step in order for the results to be abstracted once they are available.  Inessa Santos

## 2021-03-19 ENCOUNTER — IMMUNIZATION (OUTPATIENT)
Dept: NURSING | Facility: CLINIC | Age: 56
End: 2021-03-19
Payer: COMMERCIAL

## 2021-03-19 PROCEDURE — 0001A PR COVID VAC PFIZER DIL RECON 30 MCG/0.3 ML IM: CPT

## 2021-03-19 PROCEDURE — 91300 PR COVID VAC PFIZER DIL RECON 30 MCG/0.3 ML IM: CPT

## 2021-03-23 LAB — COLOGUARD-ABSTRACT: NEGATIVE

## 2021-04-09 ENCOUNTER — IMMUNIZATION (OUTPATIENT)
Dept: NURSING | Facility: CLINIC | Age: 56
End: 2021-04-09
Attending: INTERNAL MEDICINE
Payer: COMMERCIAL

## 2021-04-09 PROCEDURE — 91300 PR COVID VAC PFIZER DIL RECON 30 MCG/0.3 ML IM: CPT

## 2021-04-09 PROCEDURE — 0002A PR COVID VAC PFIZER DIL RECON 30 MCG/0.3 ML IM: CPT

## 2021-04-27 DIAGNOSIS — L94.0 CIRCUMSCRIBED SCLERODERMA: ICD-10-CM

## 2021-04-27 DIAGNOSIS — J30.1 SEASONAL ALLERGIC RHINITIS DUE TO POLLEN: ICD-10-CM

## 2021-04-28 RX ORDER — FLUTICASONE PROPIONATE 50 MCG
SPRAY, SUSPENSION (ML) NASAL
Qty: 16 G | Refills: 0 | Status: SHIPPED | OUTPATIENT
Start: 2021-04-28 | End: 2021-09-07

## 2021-04-28 RX ORDER — CLOBETASOL PROPIONATE 0.5 MG/G
OINTMENT TOPICAL
Qty: 30 G | Refills: 3 | Status: SHIPPED | OUTPATIENT
Start: 2021-04-28

## 2021-04-28 NOTE — TELEPHONE ENCOUNTER
Clobetasol:  Routing refill request to provider for review/approval because:  Drug not on the G refill protocol     Flonase:  Prescription approved per [facility] Refill Protocol.  1 month refill only; patient due for appointment (physical)    Ro MARTELL RN
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
## 2021-07-06 NOTE — PATIENT INSTRUCTIONS
Preventive Health Recommendations  Female Ages 50 - 64    Yearly exam: See your health care provider every year in order to  o Review health changes.   o Discuss preventive care.    o Review your medicines if your doctor has prescribed any.      Get a Pap test every three years (unless you have an abnormal result and your provider advises testing more often).    If you get Pap tests with HPV test, you only need to test every 5 years, unless you have an abnormal result.     You do not need a Pap test if your uterus was removed (hysterectomy) and you have not had cancer.    You should be tested each year for STDs (sexually transmitted diseases) if you're at risk.     Have a mammogram every 1 to 2 years.    Have a colonoscopy at age 50, or have a yearly FIT test (stool test). These exams screen for colon cancer.      Have a cholesterol test every 5 years, or more often if advised.    Have a diabetes test (fasting glucose) every three years. If you are at risk for diabetes, you should have this test more often.     If you are at risk for osteoporosis (brittle bone disease), think about having a bone density scan (DEXA).    Shots: Get a flu shot each year. Get a tetanus shot every 10 years.    Nutrition:     Eat at least 5 servings of fruits and vegetables each day.    Eat whole-grain bread, whole-wheat pasta and brown rice instead of white grains and rice.    Get adequate Calcium and Vitamin D.     Lifestyle    Exercise at least 150 minutes a week (30 minutes a day, 5 days a week). This will help you control your weight and prevent disease.    Limit alcohol to one drink per day.    No smoking.     Wear sunscreen to prevent skin cancer.     See your dentist every six months for an exam and cleaning.    See your eye doctor every 1 to 2 years.    Preventive Health Recommendations  Female Ages 50 - 64    Yearly exam: See your health care provider every year in order to  o Review health changes.   o Discuss preventive  care.    o Review your medicines if your doctor has prescribed any.      Get a Pap test every three years (unless you have an abnormal result and your provider advises testing more often).    If you get Pap tests with HPV test, you only need to test every 5 years, unless you have an abnormal result.     You do not need a Pap test if your uterus was removed (hysterectomy) and you have not had cancer.    You should be tested each year for STDs (sexually transmitted diseases) if you're at risk.     Have a mammogram every 1 to 2 years.    Have a colonoscopy at age 50, or have a yearly FIT test (stool test). These exams screen for colon cancer.      Have a cholesterol test every 5 years, or more often if advised.    Have a diabetes test (fasting glucose) every three years. If you are at risk for diabetes, you should have this test more often.     If you are at risk for osteoporosis (brittle bone disease), think about having a bone density scan (DEXA).    Shots: Get a flu shot each year. Get a tetanus shot every 10 years.    Nutrition:     Eat at least 5 servings of fruits and vegetables each day.    Eat whole-grain bread, whole-wheat pasta and brown rice instead of white grains and rice.    Get adequate Calcium and Vitamin D.     Lifestyle    Exercise at least 150 minutes a week (30 minutes a day, 5 days a week). This will help you control your weight and prevent disease.    Limit alcohol to one drink per day.    No smoking.     Wear sunscreen to prevent skin cancer.     See your dentist every six months for an exam and cleaning.    See your eye doctor every 1 to 2 years.  PLEASE CALL TO SCHEDULE YOUR MAMMOGRAM  [facility] [phone]  [facility] [place] [phone]  [facility]   [phone]  Central Scheduling (all locations) [phone]    If you are under/uninsured, we recommend you contact the [facility]. They offer mammograms on a sliding fee charge. You can schedule  with them at [phone].

## 2021-07-09 ENCOUNTER — OFFICE VISIT (OUTPATIENT)
Dept: FAMILY MEDICINE | Facility: CLINIC | Age: 56
End: 2021-07-09
Payer: COMMERCIAL

## 2021-07-09 VITALS
HEART RATE: 76 BPM | TEMPERATURE: 98.2 F | DIASTOLIC BLOOD PRESSURE: 88 MMHG | OXYGEN SATURATION: 95 % | SYSTOLIC BLOOD PRESSURE: 138 MMHG | WEIGHT: 293 LBS | BODY MASS INDEX: 48.82 KG/M2 | RESPIRATION RATE: 16 BRPM | HEIGHT: 65 IN

## 2021-07-09 DIAGNOSIS — Z00.00 ROUTINE GENERAL MEDICAL EXAMINATION AT A HEALTH CARE FACILITY: Primary | ICD-10-CM

## 2021-07-09 DIAGNOSIS — R25.2 CRAMP OF LIMB: ICD-10-CM

## 2021-07-09 DIAGNOSIS — I10 ESSENTIAL HYPERTENSION WITH GOAL BLOOD PRESSURE LESS THAN 140/90: ICD-10-CM

## 2021-07-09 DIAGNOSIS — E55.9 VITAMIN D DEFICIENCY: ICD-10-CM

## 2021-07-09 DIAGNOSIS — M25.562 ACUTE PAIN OF LEFT KNEE: ICD-10-CM

## 2021-07-09 LAB
ANION GAP SERPL CALCULATED.3IONS-SCNC: 6 MMOL/L (ref 3–14)
BUN SERPL-MCNC: 11 MG/DL (ref 7–30)
CALCIUM SERPL-MCNC: 9.5 MG/DL (ref 8.5–10.1)
CHLORIDE SERPL-SCNC: 108 MMOL/L (ref 94–109)
CO2 SERPL-SCNC: 25 MMOL/L (ref 20–32)
CREAT SERPL-MCNC: 0.94 MG/DL (ref 0.52–1.04)
GFR SERPL CREATININE-BSD FRML MDRD: 68 ML/MIN/{1.73_M2}
GLUCOSE SERPL-MCNC: 100 MG/DL (ref 70–99)
MAGNESIUM SERPL-MCNC: 2.2 MG/DL (ref 1.6–2.3)
POTASSIUM SERPL-SCNC: 4.1 MMOL/L (ref 3.4–5.3)
SODIUM SERPL-SCNC: 139 MMOL/L (ref 133–144)
TSH SERPL DL<=0.005 MIU/L-ACNC: 3.03 MU/L (ref 0.4–4)

## 2021-07-09 PROCEDURE — 99213 OFFICE O/P EST LOW 20 MIN: CPT | Mod: 25 | Performed by: FAMILY MEDICINE

## 2021-07-09 PROCEDURE — 99396 PREV VISIT EST AGE 40-64: CPT | Performed by: FAMILY MEDICINE

## 2021-07-09 PROCEDURE — 82306 VITAMIN D 25 HYDROXY: CPT | Performed by: FAMILY MEDICINE

## 2021-07-09 PROCEDURE — 87624 HPV HI-RISK TYP POOLED RSLT: CPT | Performed by: FAMILY MEDICINE

## 2021-07-09 PROCEDURE — 80048 BASIC METABOLIC PNL TOTAL CA: CPT | Performed by: FAMILY MEDICINE

## 2021-07-09 PROCEDURE — G0145 SCR C/V CYTO,THINLAYER,RESCR: HCPCS | Performed by: FAMILY MEDICINE

## 2021-07-09 PROCEDURE — 84443 ASSAY THYROID STIM HORMONE: CPT | Performed by: FAMILY MEDICINE

## 2021-07-09 PROCEDURE — 83735 ASSAY OF MAGNESIUM: CPT | Performed by: FAMILY MEDICINE

## 2021-07-09 PROCEDURE — 36415 COLL VENOUS BLD VENIPUNCTURE: CPT | Performed by: FAMILY MEDICINE

## 2021-07-09 ASSESSMENT — ENCOUNTER SYMPTOMS
HEMATOCHEZIA: 0
ARTHRALGIAS: 1
SORE THROAT: 0
WEAKNESS: 1
EYE PAIN: 0
DYSURIA: 0
COUGH: 0
FEVER: 0
ABDOMINAL PAIN: 0
FREQUENCY: 0
HEADACHES: 0
CHILLS: 0
JOINT SWELLING: 1
DIARRHEA: 0
NAUSEA: 0
PALPITATIONS: 0
HEARTBURN: 0
NERVOUS/ANXIOUS: 0
CONSTIPATION: 0
DIZZINESS: 1
MYALGIAS: 1
PARESTHESIAS: 0
HEMATURIA: 0
SHORTNESS OF BREATH: 0

## 2021-07-09 ASSESSMENT — MIFFLIN-ST. JEOR: SCORE: 2188.46

## 2021-07-10 LAB — DEPRECATED CALCIDIOL+CALCIFEROL SERPL-MC: 71 UG/L (ref 20–75)

## 2021-07-10 RX ORDER — LABETALOL 200 MG/1
TABLET, FILM COATED ORAL
Qty: 180 TABLET | Refills: 3 | Status: SHIPPED | OUTPATIENT
Start: 2021-07-10 | End: 2022-06-27

## 2021-07-14 LAB
COPATH REPORT: NORMAL
FINAL DIAGNOSIS: NORMAL
HPV HR 12 DNA CVX QL NAA+PROBE: NEGATIVE
HPV16 DNA SPEC QL NAA+PROBE: NEGATIVE
HPV18 DNA SPEC QL NAA+PROBE: NEGATIVE
PAP: NORMAL
SPECIMEN DESCRIPTION: NORMAL
SPECIMEN SOURCE CVX/VAG CYTO: NORMAL

## 2021-07-15 ENCOUNTER — THERAPY VISIT (OUTPATIENT)
Dept: PHYSICAL THERAPY | Facility: CLINIC | Age: 56
End: 2021-07-15
Attending: FAMILY MEDICINE
Payer: COMMERCIAL

## 2021-07-15 DIAGNOSIS — M25.562 CHRONIC PAIN OF LEFT KNEE: ICD-10-CM

## 2021-07-15 DIAGNOSIS — G89.29 CHRONIC PAIN OF LEFT KNEE: ICD-10-CM

## 2021-07-15 DIAGNOSIS — M25.562 ACUTE PAIN OF LEFT KNEE: ICD-10-CM

## 2021-07-15 PROCEDURE — 97110 THERAPEUTIC EXERCISES: CPT | Mod: GP | Performed by: PHYSICAL THERAPIST

## 2021-07-15 PROCEDURE — 97161 PT EVAL LOW COMPLEX 20 MIN: CPT | Mod: GP | Performed by: PHYSICAL THERAPIST

## 2021-07-15 NOTE — PROGRESS NOTES
Physical Therapy Initial Evaluation  Subjective:  The history is provided by the patient. No  was used.   Patient Health History  Neelam Tovar being seen for L>R knee pain.     Date of Onset: 1 year ago but got MD order on 7/9/21.   Problem occurred: Maybe having to work from home and becoming even less active   Pain score: 2-7/10.    Pertinent medical history includes: overweight, depression, high blood pressure and incontinence.   Red flags:  None as reported by patient.  Medical allergies: none.   Surgeries include:  None.    Current medications:  High blood pressure medication.    Current occupation is  for the [employer].   Primary job tasks include:  Computer work and prolonged sitting.                  Therapist Generated HPI Evaluation         Affected Side: L>R.    This is a chronic condition.    Where condition occurred: for unknown reasons.  Patient reports pain:  Anterior.  Pain is described as aching and sharp and is intermittent.  Pain is the same all the time.  Since onset symptoms are gradually worsening.  Associated symptoms:  Loss of motion/stiffness, loss of strength and edema. Symptoms are exacerbated by ascending stairs, descending stairs, kneeling, bending/squatting and sitting (inactivity, prolonged sitting, first few steps upon standing)  Relieved by: movement and ice.    Previous treatment includes physical therapy. There was significant improvement following previous treatment.  Restrictions due to condition include:  Working in normal job without restrictions.  Barriers include:  Stairs.                        Objective:    Gait:    Gait Type:  Antalgic   Weight Bearing Status:  WBAT   Assistive Devices:  None  Deviations:  General Deviations:  Stance time decr                                                 Hip Evaluation    Hip Strength:    Flexion:   Left: 4+/5   +  Pain:  Right: 5/5   Pain:                    Extension:  Left: 3+/5  Pain:Right: 3+/5     Pain:    Abduction:  Left: 3+/5     Pain:Right: 3+/5    Pain:      External Rotation:  Left: 5/5   Pain:  Right: 5/5   Pain:                     Knee Evaluation:  ROM:    AROM    Hyperextension: Left:     Right: 9  Extension:  Left: 1+    Right:  0  Flexion: Left: 118+    Right: 120        Strength:     Extension:  Left: 4-/5    Pain:+      Right: 5/5   Pain:  Flexion:  Left: 5/5   Pain:      Right: 5/5   Pain:      Quad Set Right: Good    Pain:      Palpation:    Left knee tenderness present at:  Medial Joint Line; Lateral Joint Line and Patellar Lateral  Left knee tenderness not present at:  Patellar Medial  Right knee tenderness present at:  Medial Joint Line and Patellar Medial  Right knee tenderness not present at:  Lateral Joint Line and Patellar Lateral    Mobility Testing:          Patellofemoral Superior:  Left: hypomobile      Patellofemoral Inferior:  Left: hypomobile      Functional Testing:          Quad:    Single Leg Squat:  Left:       Right:        Bilateral Leg Squat:  25+  Excessive anterior knee excursion              General     ROS    Assessment/Plan:    Patient is a 56 year old adult with left side knee complaints.    Patient has the following significant findings with corresponding treatment plan.                Diagnosis 1:  L>R knee pain due to PFS  Therapy Evaluation Codes:   1) History comprised of:   Personal factors that impact the plan of care:      None.    Comorbidity factors that impact the plan of care are:      Overweight.     Medications impacting care: None.  2) Examination of Body Systems comprised of:   Body structures and functions that impact the plan of care:      Knee.   Activity limitations that impact the plan of care are:      Sitting, Squatting/kneeling and Stairs.  3) Clinical presentation characteristics are:   Evolving/Changing.  4) Decision-Making    Low complexity using standardized patient assessment instrument and/or measureable assessment of functional  outcome.  Cumulative Therapy Evaluation is: Low complexity.    Previous and current functional limitations:  (See Goal Flow Sheet for this information)    Short term and Long term goals: (See Goal Flow Sheet for this information)     Communication ability:  Patient appears to be able to clearly communicate and understand verbal and written communication and follow directions correctly.  Treatment Explanation - The following has been discussed with the patient:   RX ordered/plan of care  Anticipated outcomes  Possible risks and side effects  This patient would benefit from PT intervention to resume normal activities.   Rehab potential is good.    Frequency:  1 X week, once daily  Duration:  for 4 weeks tapering to 2 X a month over 8 weeks  Discharge Plan:  Achieve all LTG.  Independent in home treatment program.  Reach maximal therapeutic benefit.    Please refer to the daily flowsheet for treatment today, total treatment time and time spent performing 1:1 timed codes.

## 2021-07-30 ENCOUNTER — THERAPY VISIT (OUTPATIENT)
Dept: PHYSICAL THERAPY | Facility: CLINIC | Age: 56
End: 2021-07-30
Attending: FAMILY MEDICINE
Payer: COMMERCIAL

## 2021-07-30 DIAGNOSIS — M25.562 CHRONIC PAIN OF LEFT KNEE: ICD-10-CM

## 2021-07-30 DIAGNOSIS — G89.29 CHRONIC PAIN OF LEFT KNEE: ICD-10-CM

## 2021-07-30 PROCEDURE — 97110 THERAPEUTIC EXERCISES: CPT | Mod: GP | Performed by: PHYSICAL THERAPIST

## 2021-09-08 PROBLEM — G89.29 CHRONIC PAIN OF LEFT KNEE: Status: RESOLVED | Noted: 2021-07-15 | Resolved: 2021-09-08

## 2021-09-08 PROBLEM — M25.562 CHRONIC PAIN OF LEFT KNEE: Status: RESOLVED | Noted: 2021-07-15 | Resolved: 2021-09-08

## 2021-09-12 ENCOUNTER — HEALTH MAINTENANCE LETTER (OUTPATIENT)
Age: 56
End: 2021-09-12

## 2022-06-21 ENCOUNTER — MYC MEDICAL ADVICE (OUTPATIENT)
Dept: FAMILY MEDICINE | Facility: CLINIC | Age: 57
End: 2022-06-21

## 2022-06-24 ENCOUNTER — E-VISIT (OUTPATIENT)
Dept: FAMILY MEDICINE | Facility: CLINIC | Age: 57
End: 2022-06-24
Payer: COMMERCIAL

## 2022-06-24 DIAGNOSIS — M54.50 ACUTE BILATERAL LOW BACK PAIN WITHOUT SCIATICA: Primary | ICD-10-CM

## 2022-06-24 PROCEDURE — 99421 OL DIG E/M SVC 5-10 MIN: CPT | Performed by: FAMILY MEDICINE

## 2022-06-24 RX ORDER — CYCLOBENZAPRINE HCL 5 MG
5 TABLET ORAL AT BEDTIME
Qty: 30 TABLET | Refills: 0 | Status: SHIPPED | OUTPATIENT
Start: 2022-06-24

## 2022-06-25 NOTE — PROGRESS NOTES
"Neelam is a 57 year old who is being evaluated via a billable video visit.      How would you like to obtain your AVS? MyChart  If the video visit is dropped, the invitation should be resent by:   Will anyone else be joining your video visit? No        Assessment & Plan     Acute bilateral low back pain without sciatica  Seems to be getting better now , has used the muscle relaxer and Advil during the day and that helped She will do ice, rest , tylenol or Advil for the pain but will also start PT   - Physical Therapy Referral; Future  Order for the referral placed     Essential hypertension with goal blood pressure less than 140/90  Is well controlled now on Labetalol 200mg bid , BP is well controlled , no side effects     Inattention  Pt thinks she has ADHD but she has not been tested , so I gave her a referral for the testing   - Adult Mental Health [facility] Referral; Future    Morbid obesity (H)  Healthy diet and exercise recommended     DUB (dysfunctional uterine bleeding)  We discussed doing the pelvic US to see if any fibroids or other uterine or ovarian issues   - US Pelvic Complete with Transvaginal; Future  She has had some pelvic discomfort, will schedule an OV and we will plan on doing labs       BMI:   Estimated body mass index is 58.43 kg/m  as calculated from the following:    Height as of 7/9/21: 1.651 m (5' 5\").    Weight as of 7/9/21: 159.3 kg (351 lb 1.6 oz).         Madelin Davis MD  [facility]    Kaylee Richter is a 57 year old, presenting for the following health issues:  No chief complaint on file.      History of Present Illness       Back Pain:  Neelam Tovar presents for follow up of back pain. Patient's back pain is a recurring problem.  Location of back pain:  Right lower back and right hip  Description of back pain: cramping  Back pain spreads: right buttocks    Since patient first noticed back pain, pain is: rapidly improving  Does back pain interfere " with Neelam Tovar's job:  Yes      Neelam Tovar eats 2-3 servings of fruits and vegetables daily.Neelam Tovar consumes 0 sweetened beverage(s) daily.Neelam Tovar exercises with enough effort to increase Neelam Quinoness heart rate 9 or less minutes per day.  Neelam Tovar exercises with enough effort to increase Neelam Quinoness heart rate 3 or less days per week.   Neelam Tovar is taking medications regularly.       Accompanying Signs & Symptoms:  Risk of Fracture: None  Risk of Cauda Equina: None  Risk of Infection: None  Risk of Cancer: None  Risk of Ankylosing Spondylitis: Onset at age <35, male, AND morning back stiffness  no         Review of Systems   Constitutional, HEENT, cardiovascular, pulmonary, GI, , musculoskeletal, neuro, skin, endocrine and psych systems are negative, except as otherwise noted.      Objective           Vitals:  No vitals were obtained today due to virtual visit.    Physical Exam   GENERAL: Healthy, alert and no distress  EYES: Eyes grossly normal to inspection.  No discharge or erythema, or obvious scleral/conjunctival abnormalities.  RESP: No audible wheeze, cough, or visible cyanosis.  No visible retractions or increased work of breathing.    SKIN: Visible skin clear. No significant rash, abnormal pigmentation or lesions.  NEURO: Cranial nerves grossly intact.  Mentation and speech appropriate for age.  PSYCH: Mentation appears normal, affect normal/bright, judgement and insight intact, normal speech and appearance well-groomed.    No results found for this or any previous visit (from the past 24 hour(s)).            Video-Visit Details    Video Start Time: 11:52     Type of service:  Video Visit    Video End Time:12:17 PM    Originating Location (pt. Location): Home    Distant Location (provider location):  [facility]     Platform used for Video Visit: [employer]  ..

## 2022-06-29 ENCOUNTER — VIRTUAL VISIT (OUTPATIENT)
Dept: FAMILY MEDICINE | Facility: CLINIC | Age: 57
End: 2022-06-29
Payer: COMMERCIAL

## 2022-06-29 DIAGNOSIS — R41.840 INATTENTION: ICD-10-CM

## 2022-06-29 DIAGNOSIS — I10 ESSENTIAL HYPERTENSION WITH GOAL BLOOD PRESSURE LESS THAN 140/90: ICD-10-CM

## 2022-06-29 DIAGNOSIS — E66.01 MORBID OBESITY (H): ICD-10-CM

## 2022-06-29 DIAGNOSIS — N93.8 DUB (DYSFUNCTIONAL UTERINE BLEEDING): ICD-10-CM

## 2022-06-29 DIAGNOSIS — M54.50 ACUTE BILATERAL LOW BACK PAIN WITHOUT SCIATICA: Primary | ICD-10-CM

## 2022-06-29 PROCEDURE — 99214 OFFICE O/P EST MOD 30 MIN: CPT | Mod: GT | Performed by: FAMILY MEDICINE

## 2022-08-03 NOTE — TELEPHONE ENCOUNTER
Summary:    Patient is due/failing the following:   COLONOSCOPY, MAMMOGRAM and PAP    Type of outreach:  Sent [employer] message.  Action needed: Patient needs office visit for px.    If need for provider review:    Please indicate OV, lab, MTM, or nurse appt if needed.  Indicate fasting or not fasting.                                                                                                                                          Lisa Laird, BELINDA on 7/6/2020 at 3:23 PM      
<-- Click to add NO pertinent Past Medical History

## 2022-08-04 ENCOUNTER — ANCILLARY PROCEDURE (OUTPATIENT)
Dept: ULTRASOUND IMAGING | Facility: CLINIC | Age: 57
End: 2022-08-04
Attending: FAMILY MEDICINE
Payer: COMMERCIAL

## 2022-08-04 DIAGNOSIS — N93.8 DUB (DYSFUNCTIONAL UTERINE BLEEDING): ICD-10-CM

## 2022-08-04 PROCEDURE — 76830 TRANSVAGINAL US NON-OB: CPT | Performed by: OBSTETRICS & GYNECOLOGY

## 2022-08-04 PROCEDURE — 76856 US EXAM PELVIC COMPLETE: CPT | Performed by: OBSTETRICS & GYNECOLOGY

## 2022-08-14 ENCOUNTER — HEALTH MAINTENANCE LETTER (OUTPATIENT)
Age: 57
End: 2022-08-14

## 2022-09-13 ASSESSMENT — ENCOUNTER SYMPTOMS
NERVOUS/ANXIOUS: 1
PARESTHESIAS: 0
JOINT SWELLING: 0
BREAST MASS: 0
MYALGIAS: 1
SHORTNESS OF BREATH: 0
NAUSEA: 0
HEMATURIA: 0
DIZZINESS: 0
EYE PAIN: 0
FREQUENCY: 0
FEVER: 0
DYSURIA: 0
HEADACHES: 0
SORE THROAT: 0
COUGH: 0
ARTHRALGIAS: 1
PALPITATIONS: 0
HEARTBURN: 0
HEMATOCHEZIA: 0
ABDOMINAL PAIN: 0
CHILLS: 0
CONSTIPATION: 0
WEAKNESS: 1
DIARRHEA: 0

## 2022-09-14 ENCOUNTER — OFFICE VISIT (OUTPATIENT)
Dept: FAMILY MEDICINE | Facility: CLINIC | Age: 57
End: 2022-09-14
Payer: COMMERCIAL

## 2022-09-14 VITALS
HEIGHT: 65 IN | BODY MASS INDEX: 48.82 KG/M2 | OXYGEN SATURATION: 97 % | TEMPERATURE: 95.7 F | HEART RATE: 77 BPM | DIASTOLIC BLOOD PRESSURE: 80 MMHG | SYSTOLIC BLOOD PRESSURE: 138 MMHG | RESPIRATION RATE: 16 BRPM | WEIGHT: 293 LBS

## 2022-09-14 DIAGNOSIS — Z23 ENCOUNTER FOR IMMUNIZATION: ICD-10-CM

## 2022-09-14 DIAGNOSIS — Z00.00 ROUTINE GENERAL MEDICAL EXAMINATION AT A HEALTH CARE FACILITY: Primary | ICD-10-CM

## 2022-09-14 DIAGNOSIS — R73.01 ELEVATED FASTING BLOOD SUGAR: ICD-10-CM

## 2022-09-14 DIAGNOSIS — I10 ESSENTIAL HYPERTENSION WITH GOAL BLOOD PRESSURE LESS THAN 140/90: ICD-10-CM

## 2022-09-14 LAB — HBA1C MFR BLD: 5.2 % (ref 0–5.6)

## 2022-09-14 PROCEDURE — 90682 RIV4 VACC RECOMBINANT DNA IM: CPT | Performed by: FAMILY MEDICINE

## 2022-09-14 PROCEDURE — 83036 HEMOGLOBIN GLYCOSYLATED A1C: CPT | Performed by: FAMILY MEDICINE

## 2022-09-14 PROCEDURE — 80053 COMPREHEN METABOLIC PANEL: CPT | Performed by: FAMILY MEDICINE

## 2022-09-14 PROCEDURE — 36415 COLL VENOUS BLD VENIPUNCTURE: CPT | Performed by: FAMILY MEDICINE

## 2022-09-14 PROCEDURE — 99213 OFFICE O/P EST LOW 20 MIN: CPT | Mod: 25 | Performed by: FAMILY MEDICINE

## 2022-09-14 PROCEDURE — 99396 PREV VISIT EST AGE 40-64: CPT | Mod: 25 | Performed by: FAMILY MEDICINE

## 2022-09-14 PROCEDURE — 82306 VITAMIN D 25 HYDROXY: CPT | Performed by: FAMILY MEDICINE

## 2022-09-14 PROCEDURE — 90471 IMMUNIZATION ADMIN: CPT | Performed by: FAMILY MEDICINE

## 2022-09-14 RX ORDER — LABETALOL 200 MG/1
TABLET, FILM COATED ORAL
Qty: 180 TABLET | Refills: 1 | Status: SHIPPED | OUTPATIENT
Start: 2022-09-14 | End: 2023-10-30

## 2022-09-14 ASSESSMENT — ENCOUNTER SYMPTOMS
DYSURIA: 0
FEVER: 0
WEAKNESS: 1
SORE THROAT: 0
NAUSEA: 0
SHORTNESS OF BREATH: 0
FREQUENCY: 0
DIZZINESS: 0
EYE PAIN: 0
HEADACHES: 0
JOINT SWELLING: 0
ABDOMINAL PAIN: 0
CONSTIPATION: 0
COUGH: 0
DIARRHEA: 0
HEMATURIA: 0
NERVOUS/ANXIOUS: 1
PALPITATIONS: 0
ARTHRALGIAS: 1
CHILLS: 0
MYALGIAS: 1

## 2022-09-14 ASSESSMENT — PAIN SCALES - GENERAL: PAINLEVEL: NO PAIN (0)

## 2022-09-14 NOTE — PATIENT INSTRUCTIONS
Preventive Health Recommendations  Female Ages 50 - 64    Yearly exam: See your health care provider every year in order to  Review health changes.   Discuss preventive care.    Review your medicines if your doctor has prescribed any.    Get a Pap test every three years (unless you have an abnormal result and your provider advises testing more often).  If you get Pap tests with HPV test, you only need to test every 5 years, unless you have an abnormal result.   You do not need a Pap test if your uterus was removed (hysterectomy) and you have not had cancer.  You should be tested each year for STDs (sexually transmitted diseases) if you're at risk.   Have a mammogram every 1 to 2 years.  Have a colonoscopy at age 50, or have a yearly FIT test (stool test). These exams screen for colon cancer.    Have a cholesterol test every 5 years, or more often if advised.  Have a diabetes test (fasting glucose) every three years. If you are at risk for diabetes, you should have this test more often.   If you are at risk for osteoporosis (brittle bone disease), think about having a bone density scan (DEXA).    Shots: Get a flu shot each year. Get a tetanus shot every 10 years.    Nutrition:   Eat at least 5 servings of fruits and vegetables each day.  Eat whole-grain bread, whole-wheat pasta and brown rice instead of white grains and rice.  Get adequate Calcium and Vitamin D.     Lifestyle  Exercise at least 150 minutes a week (30 minutes a day, 5 days a week). This will help you control your weight and prevent disease.  Limit alcohol to one drink per day.  No smoking.   Wear sunscreen to prevent skin cancer.   See your dentist every six months for an exam and cleaning.  See your eye doctor every 1 to 2 years.    PLEASE CALL TO SCHEDULE YOUR MAMMOGRAM  [facility] [phone]  [facility] [place] [phone]  [facility]   [phone]  Central Scheduling (all locations)  [phone]    If you are under/uninsured, we recommend you contact the [facility]. They offer mammograms on a sliding fee charge. You can schedule with them at [phone].

## 2022-09-14 NOTE — PROGRESS NOTES
SUBJECTIVE:   CC: Neelam is an 57 year old who presents for preventive health visit.       Patient has been advised of split billing requirements and indicates understanding: Yes  Healthy Habits:     Getting at least 3 servings of Calcium per day:  Yes    Bi-annual eye exam:  NO    Dental care twice a year:  NO    Sleep apnea or symptoms of sleep apnea:  Sleep apnea    Diet:  Low salt    Frequency of exercise:  1 day/week    Duration of exercise:  Less than 15 minutes    Taking medications regularly:  Yes    PHQ-2 Total Score: 2    Additional concerns today:  No  Answers for HPI/ROS submitted by the patient on 9/13/2022  Blood in stool: No  heartburn: No  peripheral edema: No  mood changes: No  Skin sensation changes: No  pelvic pain: No  vaginal bleeding: No  vaginal discharge: No  tenderness: No  breast mass: No  breast discharge: No  impotence: No  penile discharge: No            1) HTN on labetalol   2) elevated fasting glucose   3) low back pain , is much better now     Today's PHQ-2 Score:   PHQ-2 ( 1999 [employer]) 9/13/2022   Q1: Little interest or pleasure in doing things 1   Q2: Feeling down, depressed or hopeless 1   PHQ-2 Score 2   PHQ-2 Total Score (12-17 Years)- Positive if 3 or more points; Administer PHQ-A if positive -   Q1: Little interest or pleasure in doing things Several days   Q2: Feeling down, depressed or hopeless Several days   PHQ-2 Score 2       Abuse: Current or Past (Physical, Sexual or Emotional) - No  Do you feel safe in your environment? Yes        Social History     Tobacco Use     Smoking status: Never Smoker     Smokeless tobacco: Never Used   Substance Use Topics     Alcohol use: Yes     Comment: 2 drinks per week     If you drink alcohol do you typically have >3 drinks per day or >7 drinks per week? No    No flowsheet data found.    Reviewed orders with patient.  Reviewed health maintenance and updated orders accordingly - Yes  Lab work is in process  Labs reviewed in EPIC  BP  Readings from Last 3 Encounters:   09/14/22 138/80   07/09/21 138/88   10/11/18 130/76    Wt Readings from Last 3 Encounters:   09/14/22 (!) 162.4 kg (358 lb)   07/09/21 (!) 159.3 kg (351 lb 1.6 oz)   10/11/18 (!) 156.5 kg (345 lb)                  Patient Active Problem List   Diagnosis     Other acne     Obstructive sleep apnea     Allergic rhinitis due to other allergen     Lichen sclerosis of vulva     CARDIOVASCULAR SCREENING; LDL GOAL LESS THAN 160     Essential hypertension with goal blood pressure less than 140/90     Allergic rhinitis due to pollen     Morbid obesity (H)     DUB (dysfunctional uterine bleeding)     Elevated fasting blood sugar     Past Surgical History:   Procedure Laterality Date     BIOPSY      when diagnosed w lichen sclerosis     CHOLECYSTECTOMY  2004    plus duct splaying     EXCIS CHALAZION,SINGLE       HC TOOTH EXTRACTION W/FORCEP         Social History     Tobacco Use     Smoking status: Never Smoker     Smokeless tobacco: Never Used   Substance Use Topics     Alcohol use: Yes     Comment: 2 drinks per week     Family History   Problem Relation Age of Onset     C.A.D. Mother      Diabetes Mother      Hypertension Mother      Cancer Mother         pancreatic      Hyperlipidemia Mother         I think?     Obesity Mother      C.A.D. Father      Hypertension Father      Respiratory Father         sleep apnea     Obesity Father      Cancer Maternal Grandmother      Hypertension Sister      Obesity Sister      Hypertension Sister      Rheumatoid Arthritis Sister      Obesity Sister      Hypertension Sister      Obesity Sister          Current Outpatient Medications   Medication Sig Dispense Refill     labetalol (NORMODYNE) 200 MG tablet TAKE 1 TABLET(200 MG) BY MOUTH TWICE DAILY 180 tablet 1     clobetasol (TEMOVATE) 0.05 % external ointment APPLY SPRAINGLY TO THE AFFECTED AREA TWICE DAILY FOR 14 DAYS. DO NOT APPLY TO FACE 30 g 3     cyclobenzaprine (FLEXERIL) 5 MG tablet Take 1 tablet  (5 mg) by mouth At Bedtime 30 tablet 0     fluticasone (FLONASE) 50 MCG/ACT nasal spray SHAKE LIQUID AND USE 1 SPRAY IN EACH NOSTRIL DAILY 48 g 2     Allergies   Allergen Reactions     Dust Mites      Recent Labs   Lab Test 09/14/22  1425 07/09/21  1601 09/28/18  0912 01/12/18  1446 09/28/16  1615   A1C 5.2  --   --   --   --    LDL  --   --  111*  --  118*   HDL  --   --  66  --  57   TRIG  --   --  83  --  133   ALT 20  --  18 22 19   CR 0.96 0.94 0.75 0.77 0.70   GFRESTIMATED 69 68 81 79 89   GFRESTBLACK  --  79 >90 >90 >90  African American GFR Calc     POTASSIUM 4.5 4.1 4.1 3.9 3.9   TSH  --  3.03  --  1.97  --         Breast Cancer Screening:    Breast CA Risk Assessment (FHS-7) 7/9/2021   Do you have a family history of breast, colon, or ovarian cancer? No / Unknown         Mammogram Screening: Recommended mammography every 1-2 years with patient discussion and risk factor consideration  Pertinent mammograms are reviewed under the imaging tab.    History of abnormal Pap smear: NO - age 30- 65 PAP every 3 years recommended  PAP / HPV Latest Ref Rng & Units 7/9/2021 9/28/2016 4/19/2007   PAP (Historical) - NIL NIL NIL   HPV16 NEG:Negative Negative Negative -   HPV18 NEG:Negative Negative Negative -   HRHPV NEG:Negative Negative Negative -     Reviewed and updated as needed this visit by clinical staff   Tobacco  Allergies  Meds   Med Hx  Surg Hx  Fam Hx  Soc Hx          Reviewed and updated as needed this visit by Provider                   Past Medical History:   Diagnosis Date     Allergic rhinitis due to other allergen      Depressive disorder      HTN      Hypertension      Lichen sclerosis of vulva 08/01/2007    need q 6 month review indefinately     Obstructive sleep apnea (adult) (pediatric)     CPAP - 9 cm       Past Surgical History:   Procedure Laterality Date     BIOPSY      when diagnosed w lichen sclerosis     CHOLECYSTECTOMY  2004    plus duct splaying     EXCIS CHALAZION,SINGLE       HC  "TOOTH EXTRACTION W/FORCEP         Review of Systems   Constitutional: Negative for chills and fever.   HENT: Positive for congestion. Negative for ear pain, hearing loss and sore throat.    Eyes: Negative for pain and visual disturbance.   Respiratory: Negative for cough and shortness of breath.    Cardiovascular: Negative for chest pain and palpitations.   Gastrointestinal: Negative for abdominal pain, constipation, diarrhea and nausea.   Genitourinary: Positive for urgency. Negative for dysuria, frequency, genital sores and hematuria.   Musculoskeletal: Positive for arthralgias and myalgias. Negative for joint swelling.   Skin: Negative for rash.   Neurological: Positive for weakness. Negative for dizziness and headaches.   Psychiatric/Behavioral: The patient is nervous/anxious.      CONSTITUTIONAL: NEGATIVE for fever, chills, change in weight  INTEGUMENTARY/SKIN: NEGATIVE for worrisome rashes, moles or lesions  EYES: NEGATIVE for vision changes or irritation  ENT: NEGATIVE for ear, mouth and throat problems  RESP: NEGATIVE for significant cough or SOB  BREAST: NEGATIVE for masses, tenderness or discharge  CV: NEGATIVE for chest pain, palpitations or peripheral edema  GI: NEGATIVE for nausea, abdominal pain, heartburn, or change in bowel habits  : NEGATIVE for unusual urinary or vaginal symptoms. No vaginal bleeding.  MUSCULOSKELETAL: NEGATIVE for significant arthralgias or myalgia  NEURO: NEGATIVE for weakness, dizziness or paresthesias  PSYCHIATRIC: NEGATIVE for changes in mood or affect      OBJECTIVE:   BP (!) 144/81   Pulse 77   Temp (!) 95.7  F (35.4  C) (Temporal)   Resp 16   Ht 1.651 m (5' 5\")   Wt (!) 162.4 kg (358 lb)   LMP  (LMP Unknown)   SpO2 97%   BMI 59.57 kg/m    Physical Exam  GENERAL: healthy, alert and no distress  EYES: Eyes grossly normal to inspection, PERRL and conjunctivae and sclerae normal  HENT: ear canals and TM's normal, nose and mouth without ulcers or lesions  NECK: no " adenopathy, no asymmetry, masses, or scars and thyroid normal to palpation  RESP: lungs clear to auscultation - no rales, rhonchi or wheezes  BREAST: normal without masses, tenderness or nipple discharge and no palpable axillary masses or adenopathy  CV: regular rate and rhythm, normal S1 S2, no S3 or S4, no murmur, click or rub, no peripheral edema and peripheral pulses strong  ABDOMEN: soft, nontender, no hepatosplenomegaly, no masses and bowel sounds normal  MS: no gross musculoskeletal defects noted, no edema  SKIN: no suspicious lesions or rashes  NEURO: Normal strength and tone, mentation intact and speech normal  PSYCH: mentation appears normal, affect normal/bright    Diagnostic Test Results:  Labs reviewed in Epic  Results for orders placed or performed in visit on 09/14/22   Comprehensive metabolic panel (BMP + Alb, Alk Phos, ALT, AST, Total. Bili, TP)     Status: Abnormal   Result Value Ref Range    Sodium 138 133 - 144 mmol/L    Potassium 4.5 3.4 - 5.3 mmol/L    Chloride 109 94 - 109 mmol/L    Carbon Dioxide (CO2) 24 20 - 32 mmol/L    Anion Gap 5 3 - 14 mmol/L    Urea Nitrogen 15 7 - 30 mg/dL    Creatinine 0.96 0.52 - 1.25 mg/dL    Calcium 9.2 8.5 - 10.1 mg/dL    Glucose 100 (H) 70 - 99 mg/dL    Alkaline Phosphatase 84 40 - 150 U/L    AST 18 0 - 45 U/L    ALT 20 0 - 70 U/L    Protein Total 7.2 6.8 - 8.8 g/dL    Albumin 4.1 3.4 - 5.0 g/dL    Bilirubin Total 0.5 0.2 - 1.3 mg/dL    GFR Estimate 69 >60 mL/min/1.73m2    Narrative    The sex of this patient cannot be reliably determined based on discrepancies in demographics (legal sex, sex assigned at birth, gender identity).  Both male and female reference ranges are provided where applicable.  Careful evaluation of the patient s results as compared to the gender specific reference intervals is required in this setting.    Hemoglobin A1c     Status: Normal   Result Value Ref Range    Hemoglobin A1C 5.2 0.0 - 5.6 %   Vitamin D Deficiency     Status: Normal    Result Value Ref Range    Vitamin D, Total (25-Hydroxy) 73 20 - 75 ug/L    Narrative    Season, race, dietary intake, and treatment affect the concentration of 25-hydroxy-Vitamin D. Values may decrease during winter months and increase during summer months. Values 20-29 ug/L may indicate Vitamin D insufficiency and values <20 ug/L may indicate Vitamin D deficiency.    Vitamin D determination is routinely performed by an immunoassay specific for 25 hydroxyvitamin D3.  If an individual is on vitamin D2(ergocalciferol) supplementation, please specify 25 OH vitamin D2 and D3 level determination by LCMSMS test VITD23.         ASSESSMENT/PLAN:   (Z00.00) Routine general medical examination at a health care facility  (primary encounter diagnosis)  Comment: Discussed diet,calcium,exercise.Went over self breast exam.Thin prep was NOT  done.Eyes and teeth UTD.No immunizations needed today.See orders below for tests ordered and screening needed.    Plan: MA SCREENING DIGITAL BILAT - Future  (s+30),         REVIEW OF HEALTH MAINTENANCE PROTOCOL ORDERS,         Vitamin D Deficiency            (I10) Essential hypertension with goal blood pressure less than 140/90  Comment: will check labs but her BP , also due for labs   Plan: labetalol (NORMODYNE) 200 MG tablet,         Comprehensive metabolic panel (BMP + Alb, Alk         Phos, ALT, AST, Total. Bili, TP)        Refilled her medication     (R73.01) Elevated fasting blood sugar  Comment: slightly elevated fasting glucose but norml Hgb A1 c   Plan: Hemoglobin A1c            (Z23) Encounter for immunization  Comment: got her flu shot today   Plan: INFLUENZA QUAD, PF (RIV4) (FLUBLOK)              Patient has been advised of split billing requirements and indicates understanding: Yes    COUNSELING:  Reviewed preventive health counseling, as reflected in patient instructions       Regular exercise       Healthy diet/nutrition       Vision screening       Hearing screening        "Osteoporosis prevention/bone health    Estimated body mass index is 59.57 kg/m  as calculated from the following:    Height as of this encounter: 1.651 m (5' 5\").    Weight as of this encounter: 162.4 kg (358 lb).        Neelam Tovar reports that Neelam Tovar has never smoked. Neelam Tovar has never used smokeless tobacco.      Counseling Resources:  ATP IV Guidelines  Pooled Cohorts Equation Calculator  Breast Cancer Risk Calculator  BRCA-Related Cancer Risk Assessment: FHS-7 Tool  FRAX Risk Assessment  ICSI Preventive Guidelines  Dietary Guidelines for Americans, 2010  USDA's MyPlate  ASA Prophylaxis  Lung CA Screening    Madelin Davis MD  [facility]  "

## 2022-09-15 LAB
ALBUMIN SERPL-MCNC: 4.1 G/DL (ref 3.4–5)
ALP SERPL-CCNC: 84 U/L (ref 40–150)
ALT SERPL W P-5'-P-CCNC: 20 U/L (ref 0–70)
ANION GAP SERPL CALCULATED.3IONS-SCNC: 5 MMOL/L (ref 3–14)
AST SERPL W P-5'-P-CCNC: 18 U/L (ref 0–45)
BILIRUB SERPL-MCNC: 0.5 MG/DL (ref 0.2–1.3)
BUN SERPL-MCNC: 15 MG/DL (ref 7–30)
CALCIUM SERPL-MCNC: 9.2 MG/DL (ref 8.5–10.1)
CHLORIDE BLD-SCNC: 109 MMOL/L (ref 94–109)
CO2 SERPL-SCNC: 24 MMOL/L (ref 20–32)
CREAT SERPL-MCNC: 0.96 MG/DL (ref 0.52–1.25)
DEPRECATED CALCIDIOL+CALCIFEROL SERPL-MC: 73 UG/L (ref 20–75)
GFR SERPL CREATININE-BSD FRML MDRD: 69 ML/MIN/1.73M2
GLUCOSE BLD-MCNC: 100 MG/DL (ref 70–99)
POTASSIUM BLD-SCNC: 4.5 MMOL/L (ref 3.4–5.3)
PROT SERPL-MCNC: 7.2 G/DL (ref 6.8–8.8)
SODIUM SERPL-SCNC: 138 MMOL/L (ref 133–144)

## 2023-04-09 ENCOUNTER — HEALTH MAINTENANCE LETTER (OUTPATIENT)
Age: 58
End: 2023-04-09

## 2023-08-15 ENCOUNTER — PATIENT OUTREACH (OUTPATIENT)
Dept: CARE COORDINATION | Facility: CLINIC | Age: 58
End: 2023-08-15
Payer: COMMERCIAL

## 2023-08-29 ENCOUNTER — PATIENT OUTREACH (OUTPATIENT)
Dept: CARE COORDINATION | Facility: CLINIC | Age: 58
End: 2023-08-29
Payer: COMMERCIAL

## 2023-10-30 DIAGNOSIS — I10 ESSENTIAL HYPERTENSION WITH GOAL BLOOD PRESSURE LESS THAN 140/90: ICD-10-CM

## 2023-10-30 RX ORDER — LABETALOL 200 MG/1
TABLET, FILM COATED ORAL
Qty: 180 TABLET | Refills: 1 | Status: SHIPPED | OUTPATIENT
Start: 2023-10-30 | End: 2024-05-07

## 2023-11-16 ENCOUNTER — MYC MEDICAL ADVICE (OUTPATIENT)
Dept: FAMILY MEDICINE | Facility: CLINIC | Age: 58
End: 2023-11-16
Payer: COMMERCIAL

## 2023-11-16 NOTE — TELEPHONE ENCOUNTER
Referral team,      Please see Mychart message.  Patient asking for back dated referral to PAR[facility] Triplett,  Reema Ibanez RN

## 2023-11-18 ENCOUNTER — HEALTH MAINTENANCE LETTER (OUTPATIENT)
Age: 58
End: 2023-11-18

## 2023-12-14 NOTE — TELEPHONE ENCOUNTER
Flori from [employer] called for update.  Patient called her for update  Read her message from 11/20.  Informed Flori note states [employer] message has not been read by patient.    Flori will let patient know will need to submit an appeal.    Reema Ibanez RN

## 2024-03-29 DIAGNOSIS — Z12.11 COLON CANCER SCREENING: ICD-10-CM

## 2024-04-12 ENCOUNTER — ORDERS ONLY (AUTO-RELEASED) (OUTPATIENT)
Dept: FAMILY MEDICINE | Facility: CLINIC | Age: 59
End: 2024-04-12
Payer: COMMERCIAL

## 2024-04-12 DIAGNOSIS — Z12.11 COLON CANCER SCREENING: ICD-10-CM

## 2024-05-07 DIAGNOSIS — I10 ESSENTIAL HYPERTENSION WITH GOAL BLOOD PRESSURE LESS THAN 140/90: ICD-10-CM

## 2024-05-07 RX ORDER — LABETALOL 200 MG/1
TABLET, FILM COATED ORAL
Qty: 60 TABLET | Refills: 0 | Status: SHIPPED | OUTPATIENT
Start: 2024-05-07

## 2024-05-15 LAB — NONINV COLON CA DNA+OCC BLD SCRN STL QL: POSITIVE

## 2024-05-16 DIAGNOSIS — R19.5 POSITIVE COLORECTAL CANCER SCREENING USING COLOGUARD TEST: Primary | ICD-10-CM

## 2024-05-21 ENCOUNTER — TELEPHONE (OUTPATIENT)
Dept: FAMILY MEDICINE | Facility: CLINIC | Age: 59
End: 2024-05-21
Payer: COMMERCIAL

## 2024-05-21 NOTE — TELEPHONE ENCOUNTER
I sent a [employer] message about positive cologuard but pt has not viewed the message   I also left a VM on her phone   Can you mail her a letter stating that since her cologuard is positive she needs a colonoscopy ? I have placed the order for this   Thanks

## 2024-05-21 NOTE — LETTER
May 21, 2024      Neelam Tovar  [address]  [place] [id number]        Dear  Guy,    We are writing to inform you of your test results.    {results letter list:[id number]}    No results found from the In Basket message.    If you have any questions or concerns, please call the clinic at the number listed above.       Sincerely,

## 2024-12-29 ENCOUNTER — HEALTH MAINTENANCE LETTER (OUTPATIENT)
Age: 59
End: 2024-12-29

## 2025-04-13 ENCOUNTER — HEALTH MAINTENANCE LETTER (OUTPATIENT)
Age: 60
End: 2025-04-13